# Patient Record
Sex: FEMALE | Race: WHITE | NOT HISPANIC OR LATINO | Employment: OTHER | URBAN - METROPOLITAN AREA
[De-identification: names, ages, dates, MRNs, and addresses within clinical notes are randomized per-mention and may not be internally consistent; named-entity substitution may affect disease eponyms.]

---

## 2023-06-29 ENCOUNTER — HOSPITAL ENCOUNTER (OUTPATIENT)
Dept: MRI IMAGING | Facility: CLINIC | Age: 73
Discharge: HOME | End: 2023-06-29
Attending: THORACIC SURGERY (CARDIOTHORACIC VASCULAR SURGERY) | Admitting: THORACIC SURGERY (CARDIOTHORACIC VASCULAR SURGERY)
Payer: MEDICARE

## 2023-06-29 DIAGNOSIS — C34.90 NON-SMALL CELL LUNG CANCER, UNSPECIFIED LATERALITY (H): ICD-10-CM

## 2023-06-29 PROCEDURE — 255N000002 HC RX 255 OP 636: Performed by: THORACIC SURGERY (CARDIOTHORACIC VASCULAR SURGERY)

## 2023-06-29 PROCEDURE — A9585 GADOBUTROL INJECTION: HCPCS | Performed by: THORACIC SURGERY (CARDIOTHORACIC VASCULAR SURGERY)

## 2023-06-29 PROCEDURE — 70553 MRI BRAIN STEM W/O & W/DYE: CPT

## 2023-06-29 RX ORDER — GADOBUTROL 604.72 MG/ML
6 INJECTION INTRAVENOUS ONCE
Status: COMPLETED | OUTPATIENT
Start: 2023-06-29 | End: 2023-06-29

## 2023-06-29 RX ADMIN — GADOBUTROL 6 ML: 604.72 INJECTION INTRAVENOUS at 07:09

## 2023-07-06 ENCOUNTER — DOCUMENTATION ONLY (OUTPATIENT)
Dept: OTHER | Facility: CLINIC | Age: 73
End: 2023-07-06
Payer: MEDICARE

## 2023-07-26 RX ORDER — CETIRIZINE HYDROCHLORIDE 10 MG/1
1 TABLET ORAL PRN
COMMUNITY

## 2023-07-26 RX ORDER — ACETAMINOPHEN 500 MG
1-2 TABLET ORAL EVERY 6 HOURS PRN
Status: ON HOLD | COMMUNITY
End: 2023-07-31

## 2023-07-26 RX ORDER — ZOLPIDEM TARTRATE 5 MG/1
1 TABLET ORAL
COMMUNITY

## 2023-07-26 RX ORDER — ALENDRONATE SODIUM 70 MG/1
1 TABLET ORAL
COMMUNITY

## 2023-07-26 RX ORDER — FAMOTIDINE 20 MG
1 TABLET ORAL EVERY EVENING
Status: ON HOLD | COMMUNITY
End: 2023-09-19

## 2023-07-26 RX ORDER — NITROFURANTOIN MACROCRYSTALS 50 MG/1
1 CAPSULE ORAL EVERY MORNING
COMMUNITY

## 2023-07-26 RX ORDER — ZINC GLUCONATE 50 MG
1 TABLET ORAL EVERY EVENING
Status: ON HOLD | COMMUNITY
End: 2023-09-19

## 2023-07-26 RX ORDER — CYCLOSPORINE 0.5 MG/ML
1 EMULSION OPHTHALMIC 2 TIMES DAILY
COMMUNITY

## 2023-07-26 RX ORDER — MULTIVIT WITH MINERALS/LUTEIN
1 TABLET ORAL EVERY EVENING
Status: ON HOLD | COMMUNITY
End: 2023-09-19

## 2023-07-26 RX ORDER — LORATADINE 10 MG
2 TABLET ORAL EVERY MORNING
COMMUNITY

## 2023-07-26 RX ORDER — MILK THISTLE 150 MG
1 CAPSULE ORAL EVERY MORNING
Status: ON HOLD | COMMUNITY
End: 2023-09-19

## 2023-07-27 NOTE — PROGRESS NOTES
PTA medications updated by Medication Scribe prior to surgery via phone call with patient (last doses completed by Nurse)     Medication history sources: Patient, H&P, and Patient's home med list  In the past week, patient estimated taking medication this percent of the time: Greater than 90%      Significant changes made to the medication list:  None      Additional medication history information:   None    Medication reconciliation completed by provider prior to medication history? No    Time spent in this activity: 25 minutes    The information provided in this note is only as accurate as the sources available at the time of update(s)    Prior to Admission medications    Medication Sig Last Dose Taking? Auth Provider Long Term End Date   acetaminophen (TYLENOL) 500 MG tablet Take 1-2 tablets by mouth every 6 hours as needed for mild pain Unknown at prn Yes Reported, Patient     alendronate (FOSAMAX) 70 MG tablet Take 1 tablet by mouth every 7 days Sundays 7/23/2023 at am Yes Reported, Patient Yes    cetirizine (ZYRTEC) 10 MG tablet Take 1 tablet by mouth as needed for allergies Unknown at prn Yes Reported, Patient     cycloSPORINE (RESTASIS) 0.05 % ophthalmic emulsion Place 1 drop into both eyes 2 times daily 7/27/2023 at pm Yes Reported, Patient     Fiber Select Gummies CHEW Take 2 chew tab by mouth every morning 7/27/2023 at am Yes Reported, Patient     Grape Seed Extract 100 MG CAPS Take 1 capsule by mouth every evening 7/27/2023 at pm Yes Reported, Patient     Milk Thistle 150 MG CAPS Take 1 capsule by mouth every morning 7/27/2023 at am Yes Reported, Patient     nitroFURantoin macrocrystal (MACRODANTIN) 50 MG capsule Take 1 capsule by mouth every morning 7/27/2023 at am Yes Reported, Patient     Probiotic Product (PROBIOTIC PO) Take 1 tablet by mouth every morning 7/27/2023 at am Yes Reported, Patient     vitamin C (ASCORBIC ACID) 1000 MG TABS Take 1 tablet by mouth every evening 7/27/2023 at pm Yes  Reported, Patient     Vitamin D, Cholecalciferol, 25 MCG (1000 UT) CAPS Take 1 capsule by mouth every evening 7/27/2023 at pm Yes Reported, Patient     zinc gluconate 50 MG tablet Take 1 tablet by mouth every evening 7/27/2023 at pm Yes Reported, Patient     zolpidem (AMBIEN) 5 MG tablet Take 1 tablet by mouth nightly as needed for sleep (only for traveling) Unknown at prn Yes Reported, Patient         Medication history completed by:    Jose Erickson CPhT  [facility]

## 2023-07-28 ENCOUNTER — APPOINTMENT (OUTPATIENT)
Dept: GENERAL RADIOLOGY | Facility: CLINIC | Age: 73
DRG: 164 | End: 2023-07-28
Attending: THORACIC SURGERY (CARDIOTHORACIC VASCULAR SURGERY)
Payer: MEDICARE

## 2023-07-28 ENCOUNTER — ANESTHESIA EVENT (OUTPATIENT)
Dept: SURGERY | Facility: CLINIC | Age: 73
DRG: 164 | End: 2023-07-28
Payer: MEDICARE

## 2023-07-28 ENCOUNTER — ANESTHESIA (OUTPATIENT)
Dept: SURGERY | Facility: CLINIC | Age: 73
DRG: 164 | End: 2023-07-28
Payer: MEDICARE

## 2023-07-28 ENCOUNTER — HOSPITAL ENCOUNTER (INPATIENT)
Facility: CLINIC | Age: 73
LOS: 3 days | Discharge: HOME | DRG: 164 | End: 2023-07-31
Attending: THORACIC SURGERY (CARDIOTHORACIC VASCULAR SURGERY) | Admitting: THORACIC SURGERY (CARDIOTHORACIC VASCULAR SURGERY)
Payer: MEDICARE

## 2023-07-28 DIAGNOSIS — G89.18 ACUTE POST-OPERATIVE PAIN: Primary | ICD-10-CM

## 2023-07-28 DIAGNOSIS — K59.03 DRUG-INDUCED CONSTIPATION: ICD-10-CM

## 2023-07-28 LAB
ABO/RH(D): NORMAL
ANION GAP SERPL CALCULATED.3IONS-SCNC: 10 MMOL/L (ref 7–15)
ANTIBODY SCREEN: NEGATIVE
BUN SERPL-MCNC: 12.4 MG/DL (ref 8–23)
CALCIUM SERPL-MCNC: 8.7 MG/DL (ref 8.8–10.2)
CHLORIDE SERPL-SCNC: 106 MMOL/L (ref 98–107)
CREAT SERPL-MCNC: 0.82 MG/DL (ref 0.51–0.95)
CREAT SERPL-MCNC: 0.87 MG/DL (ref 0.51–0.95)
DEPRECATED HCO3 PLAS-SCNC: 24 MMOL/L (ref 22–29)
ERYTHROCYTE [DISTWIDTH] IN BLOOD BY AUTOMATED COUNT: 11.9 % (ref 10–15)
GFR SERPL CREATININE-BSD FRML MDRD: 69 ML/MIN/1.73M2
GFR SERPL CREATININE-BSD FRML MDRD: 74 ML/MIN/1.73M2
GLUCOSE BLDC GLUCOMTR-MCNC: 135 MG/DL (ref 70–99)
GLUCOSE SERPL-MCNC: 114 MG/DL (ref 70–99)
HCT VFR BLD AUTO: 40.3 % (ref 35–47)
HGB BLD-MCNC: 13.4 G/DL (ref 11.7–15.7)
INR PPP: 0.97 (ref 0.85–1.15)
MCH RBC QN AUTO: 32.7 PG (ref 26.5–33)
MCHC RBC AUTO-ENTMCNC: 33.3 G/DL (ref 31.5–36.5)
MCV RBC AUTO: 98 FL (ref 78–100)
PLATELET # BLD AUTO: 306 10E3/UL (ref 150–450)
POTASSIUM SERPL-SCNC: 4.6 MMOL/L (ref 3.4–5.3)
RBC # BLD AUTO: 4.1 10E6/UL (ref 3.8–5.2)
SODIUM SERPL-SCNC: 140 MMOL/L (ref 136–145)
SPECIMEN EXPIRATION DATE: NORMAL
WBC # BLD AUTO: 3.4 10E3/UL (ref 4–11)

## 2023-07-28 PROCEDURE — 272N000001 HC OR GENERAL SUPPLY STERILE: Performed by: THORACIC SURGERY (CARDIOTHORACIC VASCULAR SURGERY)

## 2023-07-28 PROCEDURE — 82565 ASSAY OF CREATININE: CPT | Performed by: PHYSICIAN ASSISTANT

## 2023-07-28 PROCEDURE — 360N000077 HC SURGERY LEVEL 4, PER MIN: Performed by: THORACIC SURGERY (CARDIOTHORACIC VASCULAR SURGERY)

## 2023-07-28 PROCEDURE — 999N000063 XR CHEST PORT 1 VIEW

## 2023-07-28 PROCEDURE — 86850 RBC ANTIBODY SCREEN: CPT | Performed by: THORACIC SURGERY (CARDIOTHORACIC VASCULAR SURGERY)

## 2023-07-28 PROCEDURE — 80048 BASIC METABOLIC PNL TOTAL CA: CPT | Performed by: THORACIC SURGERY (CARDIOTHORACIC VASCULAR SURGERY)

## 2023-07-28 PROCEDURE — 258N000003 HC RX IP 258 OP 636: Performed by: PHYSICIAN ASSISTANT

## 2023-07-28 PROCEDURE — 250N000025 HC SEVOFLURANE, PER MIN: Performed by: THORACIC SURGERY (CARDIOTHORACIC VASCULAR SURGERY)

## 2023-07-28 PROCEDURE — 250N000011 HC RX IP 250 OP 636: Performed by: ANESTHESIOLOGY

## 2023-07-28 PROCEDURE — 88305 TISSUE EXAM BY PATHOLOGIST: CPT | Mod: TC | Performed by: THORACIC SURGERY (CARDIOTHORACIC VASCULAR SURGERY)

## 2023-07-28 PROCEDURE — 258N000003 HC RX IP 258 OP 636: Performed by: ANESTHESIOLOGY

## 2023-07-28 PROCEDURE — 86901 BLOOD TYPING SEROLOGIC RH(D): CPT | Performed by: THORACIC SURGERY (CARDIOTHORACIC VASCULAR SURGERY)

## 2023-07-28 PROCEDURE — 999N000141 HC STATISTIC PRE-PROCEDURE NURSING ASSESSMENT: Performed by: THORACIC SURGERY (CARDIOTHORACIC VASCULAR SURGERY)

## 2023-07-28 PROCEDURE — 250N000011 HC RX IP 250 OP 636: Performed by: THORACIC SURGERY (CARDIOTHORACIC VASCULAR SURGERY)

## 2023-07-28 PROCEDURE — 36415 COLL VENOUS BLD VENIPUNCTURE: CPT | Performed by: THORACIC SURGERY (CARDIOTHORACIC VASCULAR SURGERY)

## 2023-07-28 PROCEDURE — 250N000013 HC RX MED GY IP 250 OP 250 PS 637: Performed by: PHYSICIAN ASSISTANT

## 2023-07-28 PROCEDURE — 250N000009 HC RX 250: Performed by: THORACIC SURGERY (CARDIOTHORACIC VASCULAR SURGERY)

## 2023-07-28 PROCEDURE — 36415 COLL VENOUS BLD VENIPUNCTURE: CPT | Performed by: PHYSICIAN ASSISTANT

## 2023-07-28 PROCEDURE — 0BTJ0ZZ RESECTION OF LEFT LOWER LUNG LOBE, OPEN APPROACH: ICD-10-PCS | Performed by: THORACIC SURGERY (CARDIOTHORACIC VASCULAR SURGERY)

## 2023-07-28 PROCEDURE — 710N000009 HC RECOVERY PHASE 1, LEVEL 1, PER MIN: Performed by: THORACIC SURGERY (CARDIOTHORACIC VASCULAR SURGERY)

## 2023-07-28 PROCEDURE — 120N000013 HC R&B IMCU

## 2023-07-28 PROCEDURE — 07B70ZX EXCISION OF THORAX LYMPHATIC, OPEN APPROACH, DIAGNOSTIC: ICD-10-PCS | Performed by: THORACIC SURGERY (CARDIOTHORACIC VASCULAR SURGERY)

## 2023-07-28 PROCEDURE — 370N000017 HC ANESTHESIA TECHNICAL FEE, PER MIN: Performed by: THORACIC SURGERY (CARDIOTHORACIC VASCULAR SURGERY)

## 2023-07-28 PROCEDURE — 85610 PROTHROMBIN TIME: CPT | Performed by: THORACIC SURGERY (CARDIOTHORACIC VASCULAR SURGERY)

## 2023-07-28 PROCEDURE — 250N000011 HC RX IP 250 OP 636: Mod: JZ | Performed by: PHYSICIAN ASSISTANT

## 2023-07-28 PROCEDURE — 85027 COMPLETE CBC AUTOMATED: CPT | Performed by: THORACIC SURGERY (CARDIOTHORACIC VASCULAR SURGERY)

## 2023-07-28 PROCEDURE — 250N000009 HC RX 250: Performed by: ANESTHESIOLOGY

## 2023-07-28 RX ORDER — HYDROMORPHONE HYDROCHLORIDE 1 MG/ML
0.3 INJECTION, SOLUTION INTRAMUSCULAR; INTRAVENOUS; SUBCUTANEOUS
Status: DISCONTINUED | OUTPATIENT
Start: 2023-07-28 | End: 2023-07-29

## 2023-07-28 RX ORDER — HYDROMORPHONE HCL IN WATER/PF 6 MG/30 ML
0.4 PATIENT CONTROLLED ANALGESIA SYRINGE INTRAVENOUS EVERY 5 MIN PRN
Status: DISCONTINUED | OUTPATIENT
Start: 2023-07-28 | End: 2023-07-28

## 2023-07-28 RX ORDER — CEFAZOLIN SODIUM/WATER 2 G/20 ML
2 SYRINGE (ML) INTRAVENOUS
Status: COMPLETED | OUTPATIENT
Start: 2023-07-28 | End: 2023-07-28

## 2023-07-28 RX ORDER — LIDOCAINE HYDROCHLORIDE 20 MG/ML
INJECTION, SOLUTION INFILTRATION; PERINEURAL PRN
Status: DISCONTINUED | OUTPATIENT
Start: 2023-07-28 | End: 2023-07-28

## 2023-07-28 RX ORDER — ACETAMINOPHEN 325 MG/1
975 TABLET ORAL EVERY 8 HOURS SCHEDULED
Status: DISCONTINUED | OUTPATIENT
Start: 2023-07-28 | End: 2023-07-29

## 2023-07-28 RX ORDER — PROPOFOL 10 MG/ML
INJECTION, EMULSION INTRAVENOUS CONTINUOUS PRN
Status: DISCONTINUED | OUTPATIENT
Start: 2023-07-28 | End: 2023-07-28

## 2023-07-28 RX ORDER — PROPOFOL 10 MG/ML
INJECTION, EMULSION INTRAVENOUS PRN
Status: DISCONTINUED | OUTPATIENT
Start: 2023-07-28 | End: 2023-07-28

## 2023-07-28 RX ORDER — BUPIVACAINE HYDROCHLORIDE 5 MG/ML
INJECTION, SOLUTION PERINEURAL PRN
Status: DISCONTINUED | OUTPATIENT
Start: 2023-07-28 | End: 2023-07-28 | Stop reason: HOSPADM

## 2023-07-28 RX ORDER — ALENDRONATE SODIUM 70 MG/1
70 TABLET ORAL
Status: DISCONTINUED | OUTPATIENT
Start: 2023-07-30 | End: 2023-07-28

## 2023-07-28 RX ORDER — DIPHENHYDRAMINE HYDROCHLORIDE 50 MG/ML
12.5 INJECTION INTRAMUSCULAR; INTRAVENOUS EVERY 6 HOURS PRN
Status: DISCONTINUED | OUTPATIENT
Start: 2023-07-28 | End: 2023-07-31 | Stop reason: HOSPADM

## 2023-07-28 RX ORDER — HYDROMORPHONE HCL IN WATER/PF 6 MG/30 ML
0.2 PATIENT CONTROLLED ANALGESIA SYRINGE INTRAVENOUS
Status: DISCONTINUED | OUTPATIENT
Start: 2023-07-28 | End: 2023-07-29

## 2023-07-28 RX ORDER — NITROGLYCERIN 0.4 MG/1
0.4 TABLET SUBLINGUAL EVERY 5 MIN PRN
Status: DISCONTINUED | OUTPATIENT
Start: 2023-07-28 | End: 2023-07-31

## 2023-07-28 RX ORDER — HYDROMORPHONE HCL IN WATER/PF 6 MG/30 ML
0.2 PATIENT CONTROLLED ANALGESIA SYRINGE INTRAVENOUS EVERY 5 MIN PRN
Status: DISCONTINUED | OUTPATIENT
Start: 2023-07-28 | End: 2023-07-28

## 2023-07-28 RX ORDER — IBUPROFEN 600 MG/1
600 TABLET, FILM COATED ORAL
Status: DISCONTINUED | OUTPATIENT
Start: 2023-07-29 | End: 2023-07-31 | Stop reason: HOSPADM

## 2023-07-28 RX ORDER — ACETAMINOPHEN 325 MG/1
650 TABLET ORAL EVERY 4 HOURS PRN
Status: DISCONTINUED | OUTPATIENT
Start: 2023-07-31 | End: 2023-07-29

## 2023-07-28 RX ORDER — NALOXONE HYDROCHLORIDE 0.4 MG/ML
0.2 INJECTION, SOLUTION INTRAMUSCULAR; INTRAVENOUS; SUBCUTANEOUS
Status: DISCONTINUED | OUTPATIENT
Start: 2023-07-28 | End: 2023-07-31 | Stop reason: HOSPADM

## 2023-07-28 RX ORDER — FENTANYL CITRATE 50 UG/ML
25 INJECTION, SOLUTION INTRAMUSCULAR; INTRAVENOUS EVERY 5 MIN PRN
Status: DISCONTINUED | OUTPATIENT
Start: 2023-07-28 | End: 2023-07-28

## 2023-07-28 RX ORDER — ONDANSETRON 4 MG/1
4 TABLET, ORALLY DISINTEGRATING ORAL EVERY 30 MIN PRN
Status: DISCONTINUED | OUTPATIENT
Start: 2023-07-28 | End: 2023-07-28

## 2023-07-28 RX ORDER — AMOXICILLIN 250 MG
1 CAPSULE ORAL 2 TIMES DAILY
Status: DISCONTINUED | OUTPATIENT
Start: 2023-07-28 | End: 2023-07-31 | Stop reason: HOSPADM

## 2023-07-28 RX ORDER — LIDOCAINE 40 MG/G
CREAM TOPICAL
Status: DISCONTINUED | OUTPATIENT
Start: 2023-07-28 | End: 2023-07-29

## 2023-07-28 RX ORDER — DEXAMETHASONE SODIUM PHOSPHATE 4 MG/ML
INJECTION, SOLUTION INTRA-ARTICULAR; INTRALESIONAL; INTRAMUSCULAR; INTRAVENOUS; SOFT TISSUE PRN
Status: DISCONTINUED | OUTPATIENT
Start: 2023-07-28 | End: 2023-07-28

## 2023-07-28 RX ORDER — ONDANSETRON 2 MG/ML
INJECTION INTRAMUSCULAR; INTRAVENOUS PRN
Status: DISCONTINUED | OUTPATIENT
Start: 2023-07-28 | End: 2023-07-28

## 2023-07-28 RX ORDER — ONDANSETRON 2 MG/ML
4 INJECTION INTRAMUSCULAR; INTRAVENOUS EVERY 6 HOURS PRN
Status: DISCONTINUED | OUTPATIENT
Start: 2023-07-28 | End: 2023-07-31 | Stop reason: HOSPADM

## 2023-07-28 RX ORDER — NITROFURANTOIN MACROCRYSTALS 50 MG/1
50 CAPSULE ORAL EVERY MORNING
Status: DISCONTINUED | OUTPATIENT
Start: 2023-07-28 | End: 2023-07-31 | Stop reason: HOSPADM

## 2023-07-28 RX ORDER — LIDOCAINE 40 MG/G
CREAM TOPICAL
Status: DISCONTINUED | OUTPATIENT
Start: 2023-07-28 | End: 2023-07-31

## 2023-07-28 RX ORDER — BISACODYL 10 MG
10 SUPPOSITORY, RECTAL RECTAL DAILY PRN
Status: DISCONTINUED | OUTPATIENT
Start: 2023-07-28 | End: 2023-07-31 | Stop reason: HOSPADM

## 2023-07-28 RX ORDER — DIPHENHYDRAMINE HCL 12.5MG/5ML
12.5 LIQUID (ML) ORAL EVERY 6 HOURS PRN
Status: DISCONTINUED | OUTPATIENT
Start: 2023-07-28 | End: 2023-07-31 | Stop reason: HOSPADM

## 2023-07-28 RX ORDER — SODIUM CHLORIDE 9 MG/ML
INJECTION, SOLUTION INTRAVENOUS CONTINUOUS
Status: DISCONTINUED | OUTPATIENT
Start: 2023-07-28 | End: 2023-07-31 | Stop reason: HOSPADM

## 2023-07-28 RX ORDER — KETOROLAC TROMETHAMINE 15 MG/ML
15 INJECTION, SOLUTION INTRAMUSCULAR; INTRAVENOUS EVERY 6 HOURS
Status: COMPLETED | OUTPATIENT
Start: 2023-07-28 | End: 2023-07-29

## 2023-07-28 RX ORDER — SODIUM CHLORIDE, SODIUM LACTATE, POTASSIUM CHLORIDE, CALCIUM CHLORIDE 600; 310; 30; 20 MG/100ML; MG/100ML; MG/100ML; MG/100ML
INJECTION, SOLUTION INTRAVENOUS CONTINUOUS
Status: DISCONTINUED | OUTPATIENT
Start: 2023-07-28 | End: 2023-07-28 | Stop reason: HOSPADM

## 2023-07-28 RX ORDER — MAGNESIUM HYDROXIDE 1200 MG/15ML
LIQUID ORAL PRN
Status: DISCONTINUED | OUTPATIENT
Start: 2023-07-28 | End: 2023-07-28 | Stop reason: HOSPADM

## 2023-07-28 RX ORDER — CETIRIZINE HYDROCHLORIDE 10 MG/1
10 TABLET ORAL DAILY PRN
Status: DISCONTINUED | OUTPATIENT
Start: 2023-07-28 | End: 2023-07-31 | Stop reason: HOSPADM

## 2023-07-28 RX ORDER — SODIUM CHLORIDE, SODIUM LACTATE, POTASSIUM CHLORIDE, CALCIUM CHLORIDE 600; 310; 30; 20 MG/100ML; MG/100ML; MG/100ML; MG/100ML
INJECTION, SOLUTION INTRAVENOUS CONTINUOUS
Status: DISCONTINUED | OUTPATIENT
Start: 2023-07-28 | End: 2023-07-28

## 2023-07-28 RX ORDER — ENOXAPARIN SODIUM 100 MG/ML
40 INJECTION SUBCUTANEOUS EVERY 24 HOURS
Status: DISCONTINUED | OUTPATIENT
Start: 2023-07-29 | End: 2023-07-31 | Stop reason: HOSPADM

## 2023-07-28 RX ORDER — CYCLOSPORINE 0.5 MG/ML
1 EMULSION OPHTHALMIC 2 TIMES DAILY
Status: DISCONTINUED | OUTPATIENT
Start: 2023-07-28 | End: 2023-07-31 | Stop reason: HOSPADM

## 2023-07-28 RX ORDER — HYDROMORPHONE HYDROCHLORIDE 2 MG/1
2 TABLET ORAL
Status: DISCONTINUED | OUTPATIENT
Start: 2023-07-28 | End: 2023-07-31 | Stop reason: HOSPADM

## 2023-07-28 RX ORDER — PROCHLORPERAZINE MALEATE 5 MG
5 TABLET ORAL EVERY 6 HOURS PRN
Status: DISCONTINUED | OUTPATIENT
Start: 2023-07-28 | End: 2023-07-31 | Stop reason: HOSPADM

## 2023-07-28 RX ORDER — CEFAZOLIN SODIUM/WATER 2 G/20 ML
2 SYRINGE (ML) INTRAVENOUS SEE ADMIN INSTRUCTIONS
Status: DISCONTINUED | OUTPATIENT
Start: 2023-07-28 | End: 2023-07-28 | Stop reason: HOSPADM

## 2023-07-28 RX ORDER — FAMOTIDINE 20 MG/1
20 TABLET, FILM COATED ORAL DAILY
Status: DISCONTINUED | OUTPATIENT
Start: 2023-07-28 | End: 2023-07-31 | Stop reason: HOSPADM

## 2023-07-28 RX ORDER — NALOXONE HYDROCHLORIDE 0.4 MG/ML
0.4 INJECTION, SOLUTION INTRAMUSCULAR; INTRAVENOUS; SUBCUTANEOUS
Status: DISCONTINUED | OUTPATIENT
Start: 2023-07-28 | End: 2023-07-31 | Stop reason: HOSPADM

## 2023-07-28 RX ORDER — FENTANYL CITRATE 50 UG/ML
50 INJECTION, SOLUTION INTRAMUSCULAR; INTRAVENOUS EVERY 5 MIN PRN
Status: DISCONTINUED | OUTPATIENT
Start: 2023-07-28 | End: 2023-07-28

## 2023-07-28 RX ORDER — FENTANYL CITRATE 50 UG/ML
INJECTION, SOLUTION INTRAMUSCULAR; INTRAVENOUS PRN
Status: DISCONTINUED | OUTPATIENT
Start: 2023-07-28 | End: 2023-07-28

## 2023-07-28 RX ORDER — CALCIUM CARBONATE 500 MG/1
500 TABLET, CHEWABLE ORAL 4 TIMES DAILY PRN
Status: DISCONTINUED | OUTPATIENT
Start: 2023-07-28 | End: 2023-07-31 | Stop reason: HOSPADM

## 2023-07-28 RX ORDER — ONDANSETRON 4 MG/1
4 TABLET, ORALLY DISINTEGRATING ORAL EVERY 6 HOURS PRN
Status: DISCONTINUED | OUTPATIENT
Start: 2023-07-28 | End: 2023-07-31 | Stop reason: HOSPADM

## 2023-07-28 RX ORDER — POLYETHYLENE GLYCOL 3350 17 G/17G
17 POWDER, FOR SOLUTION ORAL DAILY
Status: DISCONTINUED | OUTPATIENT
Start: 2023-07-29 | End: 2023-07-31 | Stop reason: HOSPADM

## 2023-07-28 RX ORDER — GINSENG 100 MG
CAPSULE ORAL
Status: DISCONTINUED | OUTPATIENT
Start: 2023-07-28 | End: 2023-07-31 | Stop reason: HOSPADM

## 2023-07-28 RX ORDER — ONDANSETRON 2 MG/ML
4 INJECTION INTRAMUSCULAR; INTRAVENOUS EVERY 30 MIN PRN
Status: DISCONTINUED | OUTPATIENT
Start: 2023-07-28 | End: 2023-07-28

## 2023-07-28 RX ADMIN — HYDROMORPHONE HYDROCHLORIDE 2 MG: 2 TABLET ORAL at 13:56

## 2023-07-28 RX ADMIN — HYDROMORPHONE HYDROCHLORIDE 1 MG: 2 TABLET ORAL at 23:35

## 2023-07-28 RX ADMIN — KETOROLAC TROMETHAMINE 15 MG: 15 INJECTION, SOLUTION INTRAMUSCULAR; INTRAVENOUS at 10:57

## 2023-07-28 RX ADMIN — ONDANSETRON 4 MG: 2 INJECTION INTRAMUSCULAR; INTRAVENOUS at 08:39

## 2023-07-28 RX ADMIN — PHENYLEPHRINE HYDROCHLORIDE 100 MCG: 10 INJECTION INTRAVENOUS at 08:09

## 2023-07-28 RX ADMIN — SODIUM CHLORIDE, POTASSIUM CHLORIDE, SODIUM LACTATE AND CALCIUM CHLORIDE: 600; 310; 30; 20 INJECTION, SOLUTION INTRAVENOUS at 10:05

## 2023-07-28 RX ADMIN — ACETAMINOPHEN 975 MG: 325 TABLET, FILM COATED ORAL at 11:00

## 2023-07-28 RX ADMIN — Medication 2 G: at 07:28

## 2023-07-28 RX ADMIN — ACETAMINOPHEN 975 MG: 325 TABLET, FILM COATED ORAL at 21:27

## 2023-07-28 RX ADMIN — DEXAMETHASONE SODIUM PHOSPHATE 4 MG: 4 INJECTION, SOLUTION INTRA-ARTICULAR; INTRALESIONAL; INTRAMUSCULAR; INTRAVENOUS; SOFT TISSUE at 07:40

## 2023-07-28 RX ADMIN — PHENYLEPHRINE HYDROCHLORIDE 100 MCG: 10 INJECTION INTRAVENOUS at 08:16

## 2023-07-28 RX ADMIN — PROPOFOL 40 MG: 10 INJECTION, EMULSION INTRAVENOUS at 07:33

## 2023-07-28 RX ADMIN — SODIUM CHLORIDE, POTASSIUM CHLORIDE, SODIUM LACTATE AND CALCIUM CHLORIDE: 600; 310; 30; 20 INJECTION, SOLUTION INTRAVENOUS at 06:46

## 2023-07-28 RX ADMIN — SENNOSIDES AND DOCUSATE SODIUM 1 TABLET: 50; 8.6 TABLET ORAL at 10:57

## 2023-07-28 RX ADMIN — PROPOFOL 20 MCG/KG/MIN: 10 INJECTION, EMULSION INTRAVENOUS at 07:39

## 2023-07-28 RX ADMIN — PROPOFOL 200 MG: 10 INJECTION, EMULSION INTRAVENOUS at 07:31

## 2023-07-28 RX ADMIN — ROCURONIUM BROMIDE 50 MG: 50 INJECTION, SOLUTION INTRAVENOUS at 07:31

## 2023-07-28 RX ADMIN — LIDOCAINE HYDROCHLORIDE 100 MG: 20 INJECTION, SOLUTION INFILTRATION; PERINEURAL at 07:31

## 2023-07-28 RX ADMIN — SENNOSIDES AND DOCUSATE SODIUM 1 TABLET: 50; 8.6 TABLET ORAL at 21:27

## 2023-07-28 RX ADMIN — FAMOTIDINE 20 MG: 20 TABLET ORAL at 13:56

## 2023-07-28 RX ADMIN — FENTANYL CITRATE 25 MCG: 50 INJECTION, SOLUTION INTRAMUSCULAR; INTRAVENOUS at 09:33

## 2023-07-28 RX ADMIN — PHENYLEPHRINE HYDROCHLORIDE 100 MCG: 10 INJECTION INTRAVENOUS at 08:51

## 2023-07-28 RX ADMIN — ROCURONIUM BROMIDE 10 MG: 50 INJECTION, SOLUTION INTRAVENOUS at 08:31

## 2023-07-28 RX ADMIN — SODIUM CHLORIDE: 9 INJECTION, SOLUTION INTRAVENOUS at 10:57

## 2023-07-28 RX ADMIN — NITROFURANTOIN MACROCRYSTALS 50 MG: 50 CAPSULE ORAL at 13:56

## 2023-07-28 RX ADMIN — CYCLOSPORINE 1 DROP: 0.5 EMULSION OPHTHALMIC at 21:41

## 2023-07-28 RX ADMIN — HYDROMORPHONE HYDROCHLORIDE 0.5 MG: 1 INJECTION, SOLUTION INTRAMUSCULAR; INTRAVENOUS; SUBCUTANEOUS at 07:49

## 2023-07-28 RX ADMIN — KETOROLAC TROMETHAMINE 15 MG: 15 INJECTION, SOLUTION INTRAMUSCULAR; INTRAVENOUS at 17:03

## 2023-07-28 RX ADMIN — HYDROMORPHONE HYDROCHLORIDE 2 MG: 2 TABLET ORAL at 10:57

## 2023-07-28 RX ADMIN — FENTANYL CITRATE 100 MCG: 50 INJECTION, SOLUTION INTRAMUSCULAR; INTRAVENOUS at 07:27

## 2023-07-28 RX ADMIN — SODIUM CHLORIDE: 9 INJECTION, SOLUTION INTRAVENOUS at 22:41

## 2023-07-28 RX ADMIN — PHENYLEPHRINE HYDROCHLORIDE 100 MCG: 10 INJECTION INTRAVENOUS at 07:46

## 2023-07-28 RX ADMIN — SUGAMMADEX 200 MG: 100 INJECTION, SOLUTION INTRAVENOUS at 09:13

## 2023-07-28 RX ADMIN — KETOROLAC TROMETHAMINE 15 MG: 15 INJECTION, SOLUTION INTRAMUSCULAR; INTRAVENOUS at 22:41

## 2023-07-28 ASSESSMENT — ACTIVITIES OF DAILY LIVING (ADL)
ADLS_ACUITY_SCORE: 25
ADLS_ACUITY_SCORE: 20
ADLS_ACUITY_SCORE: 25
ADLS_ACUITY_SCORE: 20
ADLS_ACUITY_SCORE: 25
ADLS_ACUITY_SCORE: 25
ADLS_ACUITY_SCORE: 20
ADLS_ACUITY_SCORE: 25
ADLS_ACUITY_SCORE: 25

## 2023-07-28 ASSESSMENT — LIFESTYLE VARIABLES: TOBACCO_USE: 1

## 2023-07-28 NOTE — PHARMACY
"The following home medications were NOT continued on inpatient admission per \"Discontinuation of nonessential home medications during hospitalization\" policy: Alendronate    If a therapeutic holiday is deemed inappropriate per the prescriber, please notify the pharmacist regarding the medication order.    The pharmacist is available to answer any questions and/or concerns the patient may have regarding discontinuation of non-essential medications.    Please ensure that these medications are restarted as needed upon discharge via the medication reconciliation discharge process and included on the discharge medication reconciliation report.    Thank you,  Sharonda Weinstein, PharmD    "

## 2023-07-28 NOTE — ANESTHESIA PREPROCEDURE EVALUATION
Prior to Admission medications    Medication Sig Start Date End Date Taking? Authorizing Provider   acetaminophen (TYLENOL) 500 MG tablet Take 1-2 tablets by mouth every 6 hours as needed for mild pain   Yes Reported, Patient   alendronate (FOSAMAX) 70 MG tablet Take 1 tablet by mouth every 7 days Sundays   Yes Reported, Patient   cetirizine (ZYRTEC) 10 MG tablet Take 1 tablet by mouth as needed for allergies   Yes Reported, Patient   cycloSPORINE (RESTASIS) 0.05 % ophthalmic emulsion Place 1 drop into both eyes 2 times daily   Yes Reported, Patient   Fiber Select Gummies CHEW Take 2 chew tab by mouth every morning   Yes Reported, Patient   Grape Seed Extract 100 MG CAPS Take 1 capsule by mouth every evening   Yes Reported, Patient   Milk Thistle 150 MG CAPS Take 1 capsule by mouth every morning   Yes Reported, Patient   nitroFURantoin macrocrystal (MACRODANTIN) 50 MG capsule Take 1 capsule by mouth every morning   Yes Reported, Patient   Probiotic Product (PROBIOTIC PO) Take 1 tablet by mouth every morning   Yes Reported, Patient   vitamin C (ASCORBIC ACID) 1000 MG TABS Take 1 tablet by mouth every evening   Yes Reported, Patient   Vitamin D, Cholecalciferol, 25 MCG (1000 UT) CAPS Take 1 capsule by mouth every evening   Yes Reported, Patient   zinc gluconate 50 MG tablet Take 1 tablet by mouth every evening   Yes Reported, Patient   zolpidem (AMBIEN) 5 MG tablet Take 1 tablet by mouth nightly as needed for sleep (only for traveling)   Yes Reported, Patient     Current Facility-Administered Medications Ordered in Epic   Medication Dose Route Frequency Last Rate Last Admin    ceFAZolin Sodium (ANCEF) injection 2 g  2 g Intravenous Pre-Op/Pre-procedure x 1 dose        ceFAZolin Sodium (ANCEF) injection 2 g  2 g Intravenous See Admin Instructions        lactated ringers infusion   Intravenous Continuous        lidocaine 1 % 0.1-1 mL  0.1-1 mL Other Q1H PRN         No current [facility]-ordered outpatient medications on file.       lactated ringers       No results for input(s): ABO, RH in the last 67267 hours.  No results for input(s): HCG in the last 19896 hours.  Recent Results (from the past 744 hour(s))   MR Brain w/o & w Contrast    Narrative    MRI BRAIN WITHOUT AND WITH CONTRAST  2023 8:16 AM     HISTORY: Non-small cell lung cancer, unspecified laterality (H).     TECHNIQUE: Multiplanar, multisequence MRI of the brain without and  with 6mL Gadavist.      COMPARISON: None.     FINDINGS: No abnormal intracranial restricted diffusion diffusion  identified. The ventricles are normal in size and configuration. There  is mild to moderate generalized cerebral volume loss. Mild scattered  patchy foci of nonspecific T2 FLAIR hyperintense signal in the  cerebral white matter, likely due to chronic small vessel ischemic  changes. Probable tiny foci of chronic lacunar infarction in the left  caudate body and left corona radiata (series 801 image 18, series 701  image 18). No intracranial hemorrhage, extra axial fluid collection,  or mass effect. No intracranial mass or abnormal enhancement is  identified.    Orbits appear grossly unremarkable, accounting for technique. Moderate  left and mild right fluid/membrane thickening in the mastoid air  cells. Major arterial flow voids of the skull base are grossly  maintained. The calvarium, skull base, and midface otherwise appear  unremarkable.      Impression    IMPRESSION:  1. No intracranial metastasis.  2. No acute intracranial process.  3. Brain atrophy and presumed chronic ischemic changes, as described.    MARYBEL BOWIE MD         SYSTEM ID:  [id number]    Anesthesia Pre-Procedure Evaluation    Patient: Erin Casas   MRN: [id number] : 1950        Procedure : Procedure(s):  Left Thoracotomy  Left Lower Lobectomy          Past Medical History:   Diagnosis Date    Abdominal bloating     Adenocarcinoma of left lung (H)     Depression     Midline thoracic back pain     Mononeuritis      Osteoporosis     Sigmoid diverticulosis       Past Surgical History:   Procedure Laterality Date    HERNIA REPAIR      pr anesthesia of shoulder for impingement, bilateral        No Known Allergies   Social History     Tobacco Use    Smoking status: Former     Packs/day: 0.50     Years: 10.00     Pack years: 5.00     Types: Cigarettes     Start date:      Quit date:      Years since quittin.5    Smokeless tobacco: Never   Substance Use Topics    Alcohol use: Yes     Comment: 7 glasses of wine per week      Wt Readings from Last 1 Encounters:   No data found for Wt        Anesthesia Evaluation   Pt has had prior anesthetic.     No history of anesthetic complications       ROS/MED HX  ENT/Pulmonary:     (+)                tobacco use, Past use,                      Neurologic:       Cardiovascular:       METS/Exercise Tolerance:     Hematologic:       Musculoskeletal:       GI/Hepatic:     (+)       Inflammatory bowel disease,             Renal/Genitourinary:       Endo:       Psychiatric/Substance Use:       Infectious Disease:       Malignancy:   (+) Malignancy, History of Lung.  Lung CA Active status post Surgery.      Other:            Physical Exam    Airway        Mallampati: I    Neck ROM: full     Respiratory Devices and Support         Dental       (+) Modest Abnormalities - crowns, retainers, 1 or 2 missing teeth      Cardiovascular   cardiovascular exam normal          Pulmonary   pulmonary exam normal                OUTSIDE LABS:  CBC:   Lab Results   Component Value Date    WBC 3.4 (L) 2023    HGB 13.4 2023    HCT 40.3 2023     2023     BMP:   Lab Results   Component Value Date     2023    POTASSIUM 4.6 2023    CHLORIDE 106 2023    CO2 24 2023    BUN 12.4 2023    CR 0.87 2023     (H) 2023     COAGS:   Lab Results   Component Value Date    INR 0.97 2023     POC: No results found for: BGM, HCG,  HCGS  HEPATIC: No results found for: ALBUMIN, PROTTOTAL, ALT, AST, GGT, ALKPHOS, BILITOTAL, BILIDIRECT, ISAURA  OTHER:   Lab Results   Component Value Date    CARLOS 8.7 (L) 07/28/2023       Anesthesia Plan    ASA Status:  2    NPO Status:  NPO Appropriate    Anesthesia Type: General.     - Airway: ETT   Induction: Intravenous.   Maintenance: Balanced.   Techniques and Equipment:     - Airway: Video-Laryngoscope, Double lumen ETT       Consents    Anesthesia Plan(s) and associated risks, benefits, and realistic alternatives discussed. Questions answered and patient/representative(s) expressed understanding.     - Discussed:     - Discussed with:  Patient            Postoperative Care    Pain management: IV analgesics.   PONV prophylaxis: Ondansetron (or other 5HT-3)     Comments:                Torito Moreno MD

## 2023-07-28 NOTE — ANESTHESIA PROCEDURE NOTES
Airway       Patient location during procedure: OR       Procedure Start/Stop Times: 7/28/2023 7:34 AM  Staff -        Anesthesiologist:  Torito Moreno MD       CRNA: Kaelyn Hidalgo APRN CRNA       Other Anesthesia Staff: Lana Solis       Performed By: anesthesiologist  Consent for Airway        Urgency: elective  Indications and Patient Condition       Indications for airway management: aaron-procedural and airway protection       Induction type:intravenous       Mask difficulty assessment: 1 - vent by mask    Final Airway Details       Final airway type: endotracheal airway       Successful airway: ETT - double lumen left  Endotracheal Airway Details        Cuffed: yes       Successful intubation technique: video laryngoscopy and flexible bronchoscopy       VL Blade Size: Glidescope 3       Grade View of Cords: 1       Adjucts: stylet       Position: Right       ETT measured from: at hub.       Bite block used: None       ETT Double lumen (fr): 35    Post intubation assessment        Placement verified by: capnometry and equal breath sounds        Number of attempts at approach: 1       Number of other approaches attempted: 0       Secured with: pink tape       Ease of procedure: easy       Dentition: Intact and Unchanged    Medication(s) Administered   Medication Administration Time: 7/28/2023 7:34 AM

## 2023-07-28 NOTE — BRIEF OP NOTE
[facility]    Brief Operative Note    Pre-operative diagnosis: Primary adenocarcinoma of lower lobe of left lung (H) [C34.32]  Post-operative diagnosis left lower lobe adenocarcinoma    Procedure: Procedure(s):  LEFT THORACOTOMY WITH MEDIASINAL LYMPH NODE DISSECTION  Left Lower Lobectomy  Surgeon: Surgeon(s) and Role:     * Baron Cummings MD - Primary     * Lilli Hernandez PA-C - Assisting  Anesthesia: General   Estimated Blood Loss: Less than 50 ml    Drains: One 28 straight chest tube to left apex  Specimens:   ID Type Source Tests Collected by Time Destination   1 : #9 INFERIOR PULMONARY LIGAMENT NODE Tissue Lymph Node(s) SURGICAL PATHOLOGY EXAM Baron Cummings MD 7/28/2023  7:56 AM    2 : #5 A-P WINDOW NODE Tissue Lymph Node(s) SURGICAL PATHOLOGY EXAM Baron Cummings MD 7/28/2023  7:57 AM    3 : #7 SUBCARINAL NODE Tissue Lymph Node(s) SURGICAL PATHOLOGY EXAM Baron Cummings MD 7/28/2023  7:59 AM    4 : #10L POSTERIOR HILAR NODE Tissue Lymph Node(s) SURGICAL PATHOLOGY EXAM Baron Cummings MD 7/28/2023  8:02 AM    5 : LEFT LOWER LOBE LUNG Tissue Lung, Lower Lobe, Left SURGICAL PATHOLOGY EXAM Baron Cummings MD 7/28/2023  8:28 AM    6 : #11 INTERLOBAR NODE Tissue Lymph Node(s) SURGICAL PATHOLOGY EXAM Baron Cummings MD 7/28/2023  8:13 AM    7 : #10L ANTERIOR HILAR NODE Tissue Lymph Node(s) SURGICAL PATHOLOGY EXAM Baron Cummings MD 7/28/2023  8:24 AM      Findings:   No pleural fluid nor pleural nodules, two palpable tumors in left lower lobe lung as expected. Await final path and staging .  Complications: None.  Implants: * No implants in log *

## 2023-07-28 NOTE — PLAN OF CARE
[id number]    Orientations: A&Ox4   Vitals/Pain: VSS, RA (1L NC at times), LS clear/diminished . Pain 5/10 (tylenol, PO dilaudid x2)   Tele: SR 1st AVB   Lines/Drains: PIV R infusing. L CT (-20 suction)   Skin/Wounds: L thoracotomy site, OnQ pump, CT site   GI/: Voiding well (bathroom), No BM (hypoactive). Pt tolerating clears well and advanced to regular diet (pt educated on going slow w/ food and starting w/ easy foods but encouraging PO intake per pt cravings).   Labs: Abnormal/Trends: BG (135), calcium (8.7), WBC (3.4)   Electrolyte Replacement- N/A  Ambulation/Assist: Assist x1 g/w   Plan: Increase AOOB, monitor pain and incision/CT sites and output. Ambulated x1 already. Ice applied to incision site. Daughters art bedside. CT has small intermittent air leak, Dr Obrien and Lilli know and saw AL.

## 2023-07-28 NOTE — OP NOTE
PROCEDURE DATE: 07/28/2023    SURGEON: Baron Cummings MD    FIRST ASSISTANT: Lilli Hernandez PA-C     PREOPERATIVE DIAGNOSIS: Adenocarcinoma left lower lobe lung.  Ground glass opacity superior segment left lower lobe lung    POSTOPERATIVE DIAGNOSIS: Same    PROCEDURES:  Limited left thoracotomy, left lower lobectomy, mediastinal lymph node dissection    ANESTHESIA: General with double-lumen endotracheal tube      INDICATIONS FOR PROCEDURE: 73-year-old woman with lung lung lesions.  The PET scan did not show evidence of issac or distant disease.  There is a biopsy-proven adenocarcinoma towards the base of the left lower lobe lung.  There is a groundglass opacity in the superior segment left lower lobe lung which is suspicious for a second primary adenocarcinoma.  Deep in the left upper lobe lung, there is a third lesion which is biopsy-proven adenocarcinoma and will be treated with SBRT.  There is a faint opacity in the right upper lobe lung that will require ongoing observation.  Options of treatment were discussed.  It was elected to proceed with a left lower lobectomy and mediastinal lymph node dissection.      DESCRIPTION OF PROCEDURE: The patient was brought to the operating room and placed in a supine position.  Under general esthesia with a double-lumen endotracheal tube, the patient was placed in the right lateral decubitus position.  The left chest was prepared and draped in usual fashion using ChloraPrep.  The ventilation of the left lung was discontinued.  A limited posterior lateral thoracotomy was made sparing the serratus anterior muscle.  The pleural space was entered in the fifth costal space preserving the integrity of the ribs.  On exploration, there is no pleural fluid or pleural nodule.  Diaphragm, hilum and mediastinum are normal.  Palpation of the lung revealed the 2 left lower lobe lung lesions.  The left upper lobe lung lesion was too deep to be clearly identified by palpation.    The  inferior pulmonary ligament was mobilized the hilum was dissected circumferentially.   Mediastinal lymph node dissection was performed excising the inferior pulmonary ligament lymph node, subcarinal lymph node, posterior hilar lymph node, AP window lymph nodes, and anterior hilar lymph nodes as well as an interlobar lymph node.  The fissure was entered exposing the pulmonary artery.  Fissure was completed posteriorly with 1 application Stearns 35 vascular stapling device and anteriorly with an application of the Stearns 60 gold stapler device    The branch of the pulmonary artery to the superior segment was dissected circumferentially and stapled with an application of the Stearns 35 vascular.  The basilar trunk was dissected circumferentially and stapled in the similar fashion.  The inferior pulmonary vein was dissected circumferentially and stapled with an Stearns 30 vascular.  The origin of the left lower lobe bronchus was dissected circumferentially and stapled with a TA 30 3.5.  The bronchus was divided distal to the staple line completing the left lower lobectomy.  Bronchial stump was airtight at a pressure of 20 cmH2O. There was an excellent reexpansion of the left upper lobe.  Pro Gel was placed on the fissure.  On-Q catheters were placed.  30 cc of Marcaine 0.5% without epinephrine was injected as intercostal blocks.  Through a separate stab wound, a 28 straight chest was placed with the tip directed towards the apex posteriorly.  The incision was closed with pericostal suture oh Vicryl No. 1, #1 Vicryl on the muscular layers, #2-0 Vicryl on the subcutaneous tissue and the skin closed with INSORB stapler.    ESTIMATED BLOOD LOSS: Minimal    COUNTS: Needle and sponge count is correct    Lilli Hernandez PA-C  was the first assistant during the procedure. Her role as first assistant during the procedure was essential and necessary to accomplishing the steps described in the procedure above, providing exposure,  retraction and handling of the scope.     Baron Cummings MD    07/28

## 2023-07-28 NOTE — ANESTHESIA POSTPROCEDURE EVALUATION
Patient: Erin Casas    Procedure: Procedure(s):  LEFT THORACOTOMY WITH MEDIASINAL LYMPH NODE DISSECTION  Left Lower Lobectomy       Anesthesia Type:  General    Note:  Disposition: Admission   Postop Pain Control: Uneventful            Sign Out: Well controlled pain   PONV: No   Neuro/Psych: Uneventful            Sign Out: Acceptable/Baseline neuro status   Airway/Respiratory: Uneventful            Sign Out: Acceptable/Baseline resp. status   CV/Hemodynamics: Uneventful            Sign Out: Acceptable CV status; No obvious hypovolemia; No obvious fluid overload   Other NRE: NONE   DID A NON-ROUTINE EVENT OCCUR?            Last vitals:  Vitals Value Taken Time   /82 07/28/23 1010   Temp 36.1  C (97  F) 07/28/23 1010   Pulse 67 07/28/23 1020   Resp 18 07/28/23 1020   SpO2 97 % 07/28/23 1021       Electronically Signed By: Torito Moreno MD  July 28, 2023  2:46 PM

## 2023-07-28 NOTE — ANESTHESIA CARE TRANSFER NOTE
Patient: Erin Casas    Procedure: Procedure(s):  LEFT THORACOTOMY WITH MEDIASINAL LYMPH NODE DISSECTION  Left Lower Lobectomy       Diagnosis: Primary adenocarcinoma of lower lobe of left lung (H) [C34.32]  Diagnosis Additional Information: No value filed.    Anesthesia Type:   General     Note:    Oropharynx: oropharynx clear of all foreign objects and spontaneously breathing  Level of Consciousness: awake  Oxygen Supplementation: face mask  Level of Supplemental Oxygen (L/min / FiO2): 8  Independent Airway: airway patency satisfactory and stable  Dentition: dentition unchanged  Vital Signs Stable: post-procedure vital signs reviewed and stable  Report to RN Given: handoff report given  Patient transferred to: PACU    Handoff Report: Identifed the Patient, Identified the Reponsible Provider, Reviewed the pertinent medical history, Discussed the surgical course, Reviewed Intra-OP anesthesia mangement and issues during anesthesia, Set expectations for post-procedure period and Allowed opportunity for questions and acknowledgement of understanding      Vitals:  Vitals Value Taken Time   /82 07/28/23 0921   Temp     Pulse 67 07/28/23 0923   Resp 21 07/28/23 0923   SpO2 100 % 07/28/23 0923   Vitals shown include unvalidated device data.    Electronically Signed By: Lana Solis  July 28, 2023  9:24 AM

## 2023-07-28 NOTE — PLAN OF CARE
Goal Outcome Evaluation:  15-19: A&O x4. VSS on RA. LS diminished, absent LLL. Encouraged IS.Tele SR. CT x1 in place, with intermittent mild air leak/no crepitus-MD aware. Denies SOB/CP. Drsg CDI ex dry old blood. On-Q pump intact. Pain managed with schduled torado. Up to bathroom with 1A/GB/W.

## 2023-07-29 ENCOUNTER — APPOINTMENT (OUTPATIENT)
Dept: GENERAL RADIOLOGY | Facility: CLINIC | Age: 73
DRG: 164 | End: 2023-07-29
Attending: PHYSICIAN ASSISTANT
Payer: MEDICARE

## 2023-07-29 LAB
ANION GAP SERPL CALCULATED.3IONS-SCNC: 7 MMOL/L (ref 7–15)
BUN SERPL-MCNC: 9.3 MG/DL (ref 8–23)
CALCIUM SERPL-MCNC: 8 MG/DL (ref 8.8–10.2)
CHLORIDE SERPL-SCNC: 109 MMOL/L (ref 98–107)
CREAT SERPL-MCNC: 0.79 MG/DL (ref 0.51–0.95)
DEPRECATED HCO3 PLAS-SCNC: 23 MMOL/L (ref 22–29)
GFR SERPL CREATININE-BSD FRML MDRD: 78 ML/MIN/1.73M2
GLUCOSE BLDC GLUCOMTR-MCNC: 88 MG/DL (ref 70–99)
GLUCOSE SERPL-MCNC: 110 MG/DL (ref 70–99)
HGB BLD-MCNC: 11.6 G/DL (ref 11.7–15.7)
POTASSIUM SERPL-SCNC: 4.5 MMOL/L (ref 3.4–5.3)
SODIUM SERPL-SCNC: 139 MMOL/L (ref 136–145)

## 2023-07-29 PROCEDURE — 36415 COLL VENOUS BLD VENIPUNCTURE: CPT | Performed by: PHYSICIAN ASSISTANT

## 2023-07-29 PROCEDURE — 250N000013 HC RX MED GY IP 250 OP 250 PS 637: Performed by: PHYSICIAN ASSISTANT

## 2023-07-29 PROCEDURE — 82310 ASSAY OF CALCIUM: CPT | Performed by: PHYSICIAN ASSISTANT

## 2023-07-29 PROCEDURE — 250N000011 HC RX IP 250 OP 636: Mod: JZ | Performed by: PHYSICIAN ASSISTANT

## 2023-07-29 PROCEDURE — 85018 HEMOGLOBIN: CPT | Performed by: PHYSICIAN ASSISTANT

## 2023-07-29 PROCEDURE — 250N000013 HC RX MED GY IP 250 OP 250 PS 637: Performed by: THORACIC SURGERY (CARDIOTHORACIC VASCULAR SURGERY)

## 2023-07-29 PROCEDURE — 120N000013 HC R&B IMCU

## 2023-07-29 PROCEDURE — 71045 X-RAY EXAM CHEST 1 VIEW: CPT

## 2023-07-29 PROCEDURE — 250N000009 HC RX 250: Performed by: PHYSICIAN ASSISTANT

## 2023-07-29 RX ORDER — ACETAMINOPHEN 500 MG
1000 TABLET ORAL 4 TIMES DAILY
Status: DISCONTINUED | OUTPATIENT
Start: 2023-07-29 | End: 2023-07-31 | Stop reason: HOSPADM

## 2023-07-29 RX ADMIN — CYCLOSPORINE 1 DROP: 0.5 EMULSION OPHTHALMIC at 19:51

## 2023-07-29 RX ADMIN — HYDROMORPHONE HYDROCHLORIDE 1 MG: 2 TABLET ORAL at 21:43

## 2023-07-29 RX ADMIN — IBUPROFEN 600 MG: 600 TABLET ORAL at 21:43

## 2023-07-29 RX ADMIN — POLYETHYLENE GLYCOL 3350 17 G: 17 POWDER, FOR SOLUTION ORAL at 08:52

## 2023-07-29 RX ADMIN — MAGNESIUM HYDROXIDE 30 ML: 400 SUSPENSION ORAL at 17:24

## 2023-07-29 RX ADMIN — KETOROLAC TROMETHAMINE 15 MG: 15 INJECTION, SOLUTION INTRAMUSCULAR; INTRAVENOUS at 04:44

## 2023-07-29 RX ADMIN — BACITRACIN: 500 OINTMENT TOPICAL at 09:15

## 2023-07-29 RX ADMIN — CYCLOSPORINE 1 DROP: 0.5 EMULSION OPHTHALMIC at 08:45

## 2023-07-29 RX ADMIN — ACETAMINOPHEN 975 MG: 325 TABLET, FILM COATED ORAL at 05:58

## 2023-07-29 RX ADMIN — ENOXAPARIN SODIUM 40 MG: 40 INJECTION SUBCUTANEOUS at 08:52

## 2023-07-29 RX ADMIN — IBUPROFEN 600 MG: 600 TABLET ORAL at 17:19

## 2023-07-29 RX ADMIN — IBUPROFEN 600 MG: 600 TABLET ORAL at 11:38

## 2023-07-29 RX ADMIN — ACETAMINOPHEN 1000 MG: 500 TABLET ORAL at 17:19

## 2023-07-29 RX ADMIN — NITROFURANTOIN MACROCRYSTALS 50 MG: 50 CAPSULE ORAL at 08:52

## 2023-07-29 RX ADMIN — FAMOTIDINE 20 MG: 20 TABLET ORAL at 08:52

## 2023-07-29 RX ADMIN — ACETAMINOPHEN 1000 MG: 500 TABLET ORAL at 12:30

## 2023-07-29 RX ADMIN — SENNOSIDES AND DOCUSATE SODIUM 1 TABLET: 50; 8.6 TABLET ORAL at 08:52

## 2023-07-29 RX ADMIN — ACETAMINOPHEN 1000 MG: 500 TABLET ORAL at 21:43

## 2023-07-29 ASSESSMENT — ACTIVITIES OF DAILY LIVING (ADL)
ADLS_ACUITY_SCORE: 25

## 2023-07-29 NOTE — PLAN OF CARE
A&Ox4. VSS on room air. Scheduled tylenol and ibuprofen for L chest/flank/shoulder pain. L chest tube to water seal. Intermittent air leak with cough. Mild krepitus. 220 ml sanguinous output this shift. LS dim/clear. OnQ pump infusing, clamps open & sensors taped. Pt ambulating in halls w/ SBA. Tolerating regular diet. C/o constipation and abdominal fullness - MoM given. + BM. L PIV SL.

## 2023-07-29 NOTE — PLAN OF CARE
Patient is A&Ox 4. Vitals are stable on room air. Tele is SR.  Patient is up with assist of 1, GBW. Sleep Quality is adequate, slept well through night. CT in place, -20 suction with sanguinous output. Small airleak, MD aware. OnQ pump infusing. Purewick in place overnight, but leaked, so output unmeasured. NS running at 75mL/hr through night. Stopped at 0600. Pain managed with scheduled Tylenol and Toradol. Gave PO Dilauded 1x.

## 2023-07-29 NOTE — PROGRESS NOTES
THORACIC SURGERY POD # 1    Discussed findings and procedure  AVSS on RA  Tiny air leak with cough only, no air leak on water seal    CXR small apical space    Satisfactory    CT  to water seal  Ambulate  Resp care ++    RED ALMONTE MD [facility] ONCOLOGY THORACIC SURGERY  CELL:  [phone]  OFFICE: [phone]

## 2023-07-30 ENCOUNTER — APPOINTMENT (OUTPATIENT)
Dept: GENERAL RADIOLOGY | Facility: CLINIC | Age: 73
DRG: 164 | End: 2023-07-30
Attending: PHYSICIAN ASSISTANT
Payer: MEDICARE

## 2023-07-30 LAB — GLUCOSE BLDC GLUCOMTR-MCNC: 91 MG/DL (ref 70–99)

## 2023-07-30 PROCEDURE — 250N000013 HC RX MED GY IP 250 OP 250 PS 637: Performed by: THORACIC SURGERY (CARDIOTHORACIC VASCULAR SURGERY)

## 2023-07-30 PROCEDURE — 71045 X-RAY EXAM CHEST 1 VIEW: CPT

## 2023-07-30 PROCEDURE — 120N000013 HC R&B IMCU

## 2023-07-30 PROCEDURE — 250N000011 HC RX IP 250 OP 636: Mod: JZ | Performed by: PHYSICIAN ASSISTANT

## 2023-07-30 PROCEDURE — 250N000013 HC RX MED GY IP 250 OP 250 PS 637: Performed by: PHYSICIAN ASSISTANT

## 2023-07-30 RX ADMIN — HYDROMORPHONE HYDROCHLORIDE 1 MG: 2 TABLET ORAL at 21:18

## 2023-07-30 RX ADMIN — HYDROMORPHONE HYDROCHLORIDE 1 MG: 2 TABLET ORAL at 04:01

## 2023-07-30 RX ADMIN — CYCLOSPORINE 1 DROP: 0.5 EMULSION OPHTHALMIC at 08:00

## 2023-07-30 RX ADMIN — ENOXAPARIN SODIUM 40 MG: 40 INJECTION SUBCUTANEOUS at 08:00

## 2023-07-30 RX ADMIN — IBUPROFEN 600 MG: 600 TABLET ORAL at 17:29

## 2023-07-30 RX ADMIN — IBUPROFEN 600 MG: 600 TABLET ORAL at 13:33

## 2023-07-30 RX ADMIN — ACETAMINOPHEN 1000 MG: 500 TABLET ORAL at 08:00

## 2023-07-30 RX ADMIN — IBUPROFEN 600 MG: 600 TABLET ORAL at 08:00

## 2023-07-30 RX ADMIN — NITROFURANTOIN MACROCRYSTALS 50 MG: 50 CAPSULE ORAL at 07:59

## 2023-07-30 RX ADMIN — IBUPROFEN 600 MG: 600 TABLET ORAL at 21:19

## 2023-07-30 RX ADMIN — ACETAMINOPHEN 1000 MG: 500 TABLET ORAL at 13:33

## 2023-07-30 RX ADMIN — ACETAMINOPHEN 1000 MG: 500 TABLET ORAL at 21:18

## 2023-07-30 RX ADMIN — FAMOTIDINE 20 MG: 20 TABLET ORAL at 08:00

## 2023-07-30 RX ADMIN — CYCLOSPORINE 1 DROP: 0.5 EMULSION OPHTHALMIC at 21:17

## 2023-07-30 RX ADMIN — ACETAMINOPHEN 1000 MG: 500 TABLET ORAL at 17:29

## 2023-07-30 ASSESSMENT — ACTIVITIES OF DAILY LIVING (ADL)
ADLS_ACUITY_SCORE: 22
ADLS_ACUITY_SCORE: 26
ADLS_ACUITY_SCORE: 22
ADLS_ACUITY_SCORE: 26
ADLS_ACUITY_SCORE: 22
ADLS_ACUITY_SCORE: 26
ADLS_ACUITY_SCORE: 22
ADLS_ACUITY_SCORE: 22
ADLS_ACUITY_SCORE: 26
ADLS_ACUITY_SCORE: 22

## 2023-07-30 NOTE — PLAN OF CARE
Goal Outcome Evaluation:      Plan of Care Reviewed With: patient    Overall Patient Progress: improvingOverall Patient Progress: improving    Orientations: A/Ox4  Vitals/Pain: VSS on RA, pain managed w ibuprofen, tylenol, dilaudid   LS: diminished, congested cough   Lines/Drains: R PIV SL. CT to water seal w sanguinous output   Skin/Wounds: L posterior incision site, L CT, on-q pump patent  GI/: Diarrhea like stools, adequate uo.   Labs: BS (91)  Ambulation/Assist: SBA   Sleep Quality: Good, slept between cares   Plan: Ambulation, breathing exercises, chest xray in AM

## 2023-07-30 NOTE — PROGRESS NOTES
THORACIC SURGERY POD # 2    Doing well  AVSS on  RA  No air leak    CXR  OK    Clamp CT tonight    Most likely D/C  tomorrow    RED ALMONTE MD [facility] ONCOLOGY THORACIC SURGERY  CELL:  [phone]  OFFICE: [phone]

## 2023-07-30 NOTE — PLAN OF CARE
A&Ox4. VSS on room air. Scheduled tylenol and ibuprofen for L shoulder/chest/back pain. Chest tube to water seal. Serosanguinous output. No air leak/very mild crepitus felt thru L mid/upper back. OnQ pump in place - clamps open, sensors taped. LS clear/dim. No PIV access - OK per MD. Up w/ SBA. Ambulating well in halls. Tolerating regular diet. Plan to clamp CT at midnight and get CXR in AM.

## 2023-07-31 ENCOUNTER — APPOINTMENT (OUTPATIENT)
Dept: GENERAL RADIOLOGY | Facility: CLINIC | Age: 73
DRG: 164 | End: 2023-07-31
Attending: PHYSICIAN ASSISTANT
Payer: MEDICARE

## 2023-07-31 VITALS
WEIGHT: 134.48 LBS | HEART RATE: 82 BPM | SYSTOLIC BLOOD PRESSURE: 118 MMHG | OXYGEN SATURATION: 96 % | BODY MASS INDEX: 21.11 KG/M2 | DIASTOLIC BLOOD PRESSURE: 89 MMHG | HEIGHT: 67 IN | RESPIRATION RATE: 16 BRPM | TEMPERATURE: 97.9 F

## 2023-07-31 LAB — PLATELET # BLD AUTO: 248 10E3/UL (ref 150–450)

## 2023-07-31 PROCEDURE — 250N000013 HC RX MED GY IP 250 OP 250 PS 637: Performed by: THORACIC SURGERY (CARDIOTHORACIC VASCULAR SURGERY)

## 2023-07-31 PROCEDURE — 71045 X-RAY EXAM CHEST 1 VIEW: CPT

## 2023-07-31 PROCEDURE — 85049 AUTOMATED PLATELET COUNT: CPT | Performed by: PHYSICIAN ASSISTANT

## 2023-07-31 PROCEDURE — 250N000013 HC RX MED GY IP 250 OP 250 PS 637: Performed by: PHYSICIAN ASSISTANT

## 2023-07-31 PROCEDURE — 88305 TISSUE EXAM BY PATHOLOGIST: CPT | Mod: 26 | Performed by: PATHOLOGY

## 2023-07-31 PROCEDURE — 36415 COLL VENOUS BLD VENIPUNCTURE: CPT | Performed by: PHYSICIAN ASSISTANT

## 2023-07-31 PROCEDURE — 88309 TISSUE EXAM BY PATHOLOGIST: CPT | Mod: 26 | Performed by: PATHOLOGY

## 2023-07-31 PROCEDURE — 250N000011 HC RX IP 250 OP 636: Mod: JZ | Performed by: PHYSICIAN ASSISTANT

## 2023-07-31 PROCEDURE — 88360 TUMOR IMMUNOHISTOCHEM/MANUAL: CPT | Mod: 26 | Performed by: PATHOLOGY

## 2023-07-31 RX ORDER — ACETAMINOPHEN 500 MG
1000 TABLET ORAL 4 TIMES DAILY
Qty: 100 TABLET | Refills: 0 | Status: ON HOLD | OUTPATIENT
Start: 2023-07-31 | End: 2023-09-19

## 2023-07-31 RX ORDER — AMOXICILLIN 250 MG
1-3 CAPSULE ORAL 2 TIMES DAILY PRN
Qty: 30 TABLET | Refills: 0 | Status: ON HOLD | OUTPATIENT
Start: 2023-07-31 | End: 2023-09-19

## 2023-07-31 RX ORDER — HYDROMORPHONE HYDROCHLORIDE 2 MG/1
1-2 TABLET ORAL EVERY 6 HOURS PRN
Qty: 10 TABLET | Refills: 0 | Status: ON HOLD | OUTPATIENT
Start: 2023-07-31 | End: 2023-09-19

## 2023-07-31 RX ORDER — IBUPROFEN 600 MG/1
600 TABLET, FILM COATED ORAL
Qty: 100 TABLET | Refills: 0 | Status: ON HOLD | OUTPATIENT
Start: 2023-07-31 | End: 2023-09-19

## 2023-07-31 RX ADMIN — ENOXAPARIN SODIUM 40 MG: 40 INJECTION SUBCUTANEOUS at 08:15

## 2023-07-31 RX ADMIN — FAMOTIDINE 20 MG: 20 TABLET ORAL at 08:12

## 2023-07-31 RX ADMIN — CYCLOSPORINE 1 DROP: 0.5 EMULSION OPHTHALMIC at 05:41

## 2023-07-31 RX ADMIN — NITROFURANTOIN MACROCRYSTALS 50 MG: 50 CAPSULE ORAL at 08:12

## 2023-07-31 RX ADMIN — IBUPROFEN 600 MG: 600 TABLET ORAL at 08:12

## 2023-07-31 RX ADMIN — ACETAMINOPHEN 1000 MG: 500 TABLET ORAL at 08:12

## 2023-07-31 RX ADMIN — HYDROMORPHONE HYDROCHLORIDE 1 MG: 2 TABLET ORAL at 05:39

## 2023-07-31 ASSESSMENT — ACTIVITIES OF DAILY LIVING (ADL)
ADLS_ACUITY_SCORE: 26

## 2023-07-31 NOTE — PLAN OF CARE
VSS on RA. A/Ox4. Pain managed w/ meds (see MAR). LS dim, denies SOB. IS at bedside, self-administers. PAVAN cherry site WDL. CT removed, dressing CDI. OnQ pump infusing, clamps open and sensors taped. Went over AVS w/ pt and daughters. Questions answered and pt and daughters verbalized understanding. Personal belongings, discharge meds and dressing supplies sent w/ pt. Daughter to transport home.     Goal Outcome Evaluation:      Plan of Care Reviewed With: patient, child    Overall Patient Progress: improving    Outcome Evaluation: Discharged

## 2023-07-31 NOTE — PROGRESS NOTES
"Thoracic Surgery POD #3:  /89 (BP Location: Left arm)   Pulse 82   Temp 97.9  F (36.6  C) (Oral)   Resp 16   Ht 1.702 m (5' 7\")   Wt 61 kg (134 lb 7.7 oz)   SpO2 96%   BMI 21.06 kg/m    CXR: small stable left apical space after CT clamp trial  Final path: pending    S: Sharp severe pain this morning left lateral chest, radiating anteriorly. Not seemingly caused by cough/movement, not associated with dyspnea. Resolved with medication and position change. Fully instructed on wound care, pain mgmt, follow up.  O: Inc: steris intact, no erythema, no swelling  On-Q: infusing  CT: DCed without complication.  Occlusive dressing applied.  P: OK to discharge home today  We will call with time for CXR and post-op appt on 8/7  Will call with final path and staging    Lilli Hernandez PA-C with Dr. [facility]  Cell [phone]      "

## 2023-07-31 NOTE — PLAN OF CARE
Goal Outcome Evaluation:      Plan of Care Reviewed With: patient    Overall Patient Progress: improvingOverall Patient Progress: improving    Orientations: A/Ox4  Vitals/Pain: VSS on RA, pain managed with ibuprofen, tylenol, dilaudid   LS: diminished, fine/coarse crackles in LLL   Lines/Drains: CT clamped at 0000  Skin/Wounds: L posterior thora site, L CT, OnQ pump   GI/: Adequate uo via purewick overnight, BM- BS+   Labs: Platelets (248)  Ambulation/Assist: SBA, walk x2 in the evening   Sleep Quality: Good, slept between cares   Plan: Chest xray, possible dx home

## 2023-07-31 NOTE — DISCHARGE INSTRUCTIONS
"[facility]   Discharge instructions and Follow-Up Care for Dr. Cummings' Thoracoscopy and Thoracotomy patients:       You will receive a call from our , Radha, with details about your post-op chest x-ray and appointment on Monday, August 7. For reference: Johanna [phone] or Radha [phone]    Patient care:  Call Dr. Cummings' office [phone] if you experience:   *Severe chills or fever of 101 F or maria victoria on two occasions   *Severely increased incisional pain that cannot be relieved with rest or pain medications   *Presence of unusual incisional or chest tube site drainage that is odorous, green or yellow in color, hortensia bright red blood or if your incision is warm/red/swollen   *Coughing up bright red blood or greenish-yellow secretions   *Inability to urinate or have a bowel movement   *New pain or swelling in your legs    In an emergency, call [phone] or have someone drive you to an Emergency Department    Pain Relief:  You may take ibuprofen and acetaminophen according to the directions on the medication bottle. (Common commercial names for ibuprofen at Advil or Motrin.  The common commercial name for acetaminophen is Tylenol.) You may also have been prescribed a narcotic pain medicine. Most people get good pain relief by \"staggering\" these medications.     No driving while on narcotic pain medicines.    Activity:  __XXX_No lifting greater than 10 pounds with your operative side arm for the next 4 weeks while you heal    Wound Care:   *Please look at your incisions daily and keep them clean while they heal   *Do not apply creams, salves such as Bacitracin, or ointments on the incisions while they heal   *Steri strips (thin white pieces of tape) will be present on the incision(s) and will peel off as your incision heals-- otherwise, they will be removed at your post-op appointment   *Remove the dressing covering your chest tube site 48 hours after your discharge from " the hospital. You may then shower. Wash the incision and chest tube sites daily with soap and water. No bathing/immersing incisions under water for two weeks or until the chest tube sites are completely healed.   *After your shower, pat the chest tube sites dry and place a dry gauze dressing (and tape) over the sites. It is normal to have some drainage from the sites for a few days. Do not be alarmed if a large amount of fluid drains (should be pink or yellow) either spontaneously or with coughing or exertion. Should this happen, just place a larger, thicker gauze dressing over the chest tube sites. In about a week, drainage should stop and a scab will form. Once drainage stops, you can stop covering the sites. Call our office if the drainage is milky or green in color or foul-smelling.    Respiratory Care:  Utilize the incentive spirometer and flutter valve/acapella (if you received one) several times in a row every few hours while awake for a few weeks after discharging home from the hospital.    Activity:  It may take a few weeks to regain your normal energy level/stamina. It is important during your recovery to get regular physical activity such as walking each day, climbing stairs as tolerated, and avoiding prolonged daytime naps    Return to Work:  Time away from work will depend on your specific situation. In general, you will need between 1-6 weeks to fully recover from surgery. Specific dates for retuning to work can be discussed at your post-op appointment.

## 2023-08-01 LAB
INTERPRETATION: NORMAL
LAB DIRECTOR COMMENTS: NORMAL
LAB DIRECTOR DISCLAIMER: NORMAL
LAB DIRECTOR INTERPRETATION: NORMAL
LAB DIRECTOR METHODOLOGY: NORMAL
LAB DIRECTOR RESULTS: NORMAL
SIGNIFICANT RESULTS: NORMAL
SPECIMEN DESCRIPTION: NORMAL
SPECIMEN DESCRIPTION: NORMAL
TEST DETAILS, MDL: NORMAL

## 2023-08-01 PROCEDURE — 81445 SO NEO GSAP 5-50DNA/DNA&RNA: CPT | Performed by: THORACIC SURGERY (CARDIOTHORACIC VASCULAR SURGERY)

## 2023-08-01 PROCEDURE — G0452 MOLECULAR PATHOLOGY INTERPR: HCPCS | Mod: 26 | Performed by: PATHOLOGY

## 2023-08-01 NOTE — DISCHARGE SUMMARY
THORACIC SURGERY HOSPITAL DISCHARGE SUMMARY  [facility] - THORACIC SURGERY  [address], [address]  [place] [address]  Phone [phone]  [email or web address]    8/1/2023     Sheridan Dominguez   [address] / [facility] [address]  Phone: [phone]   Fax: [phone]        Re: Erin Casas             1950             [id number]              Dates of Hospitalization: 7/28/2023 - 7/31/2023   Date of Service (when I saw the patient): 7/31/23    Dear Dr. Dominguez:     As you are aware, we had the pleasure of caring for your patient,  Erin Casas here at [facility].  She is a  73-year-old woman with lung lung lesions.  The PET scan did not show evidence of issac or distant disease.  There is a biopsy-proven adenocarcinoma towards the base of the left lower lobe lung.  There is a groundglass opacity in the superior segment left lower lobe lung which is suspicious for a second primary adenocarcinoma.  Deep in the left upper lobe lung, there is a third lesion which is biopsy-proven adenocarcinoma and will be treated with SBRT.  There is a faint opacity in the right upper lobe lung that will require ongoing observation.  Options of treatment were discussed.  It was elected to proceed with a left lower lobectomy and mediastinal lymph node dissection.    On 7/28/2023, Dr. Baron Cummings performed the following:    Procedure/Surgery Information   Procedure: Limited left thoracotomy, left lower lobectomy, mediastinal lymph node dissection   Surgeon(s): Surgeon(s) and Role:     * Baron Cummings MD - Primary     * Lilli Hernandez PA-C - Assisting   Specimens: ID Type Source Tests Collected by Time Destination   1 : #9 INFERIOR PULMONARY LIGAMENT NODE Tissue Lymph Node(s) SURGICAL PATHOLOGY EXAM Baron Cummings MD 7/28/2023  7:56 AM    2 : #5 A-P WINDOW NODE Tissue Lymph Node(s) SURGICAL PATHOLOGY EXAM Emiliano  Baron Card MD 7/28/2023  7:57 AM    3 : #7 SUBCARINAL NODE Tissue Lymph Node(s) SURGICAL PATHOLOGY EXAM Baron Cummings MD 7/28/2023  7:59 AM    4 : #10L POSTERIOR HILAR NODE Tissue Lymph Node(s) SURGICAL PATHOLOGY EXAM Baron Cummings MD 7/28/2023  8:02 AM    5 : LEFT LOWER LOBE LUNG Tissue Lung, Lower Lobe, Left SURGICAL PATHOLOGY EXAM Baron Cummings MD 7/28/2023  8:28 AM    6 : #11 INTERLOBAR NODE Tissue Lymph Node(s) SURGICAL PATHOLOGY EXAM Baron Cummings MD 7/28/2023  8:13 AM    7 : #10L ANTERIOR HILAR NODE Tissue Lymph Node(s) SURGICAL PATHOLOGY EXAM Baron Cummings MD 7/28/2023  8:24 AM             Final Surgical Pathology Revealed:  Two separate acinar predominant adenocarcinoma nodules in left lower lobe lung with greatest dimension of 1.8 cm, no visceral pleural invasion, and one left hilar lymph node positive for tumor spread. Final pathologic stage [id number], Final stage IIB.    Her post-operative course was unremarkable.      Consultations This Hospital Stay   None    Erin Casas has otherwise recovered sufficiently to be discharged to **home* today, 7/31/2023, on post-operative day number three for further convalescence.  Her incisions are healing well with no signs or symptoms of infection.  Her bowels have moved sufficiently and she is tolerating diet and activity, ambulating and transferring independently.  She is currently afebrile with stable vital signs.     Below, you will find a full discharge medication list and instructions.  We have arranged for Erin Casas to follow-up with us in our [facility] in 7-10 days with a Chest X-ray prior to that appointment.  We thank you for allowing us to participate in the care of Erin Casas here at [facility].  Please feel free to contact our office at [phone] with any questions or concerns or if we can be of any further assistance in the care of this  patient.    Sincerely,    Dr. Baron Cummings MD    D/C Summary Prepared by: RAFIA SutherlandC    Discharge Medications:  Discharge Medication List as of 7/31/2023 10:48 AM      START taking these medications    Details   HYDROmorphone (DILAUDID) 2 MG tablet Take 0.5-1 tablets (1-2 mg) by mouth every 6 hours as needed for moderate pain, Disp-10 tablet, R-0, E-Prescribe      ibuprofen (ADVIL/MOTRIN) 600 MG tablet Take 1 tablet (600 mg) by mouth 4 times daily (before meals and nightly), Disp-100 tablet, R-0, E-Prescribe      senna-docusate (SENOKOT-S/PERICOLACE) 8.6-50 MG tablet Take 1-3 tablets by mouth 2 times daily as needed for constipation, Disp-30 tablet, R-0, E-Prescribe         CONTINUE these medications which have CHANGED    Details   acetaminophen (TYLENOL) 500 MG tablet Take 2 tablets (1,000 mg) by mouth 4 times daily, Disp-100 tablet, R-0, E-Prescribe         CONTINUE these medications which have NOT CHANGED    Details   alendronate (FOSAMAX) 70 MG tablet Take 1 tablet by mouth every 7 days Sundays, Historical      cetirizine (ZYRTEC) 10 MG tablet Take 1 tablet by mouth as needed for allergies, Historical      cycloSPORINE (RESTASIS) 0.05 % ophthalmic emulsion Place 1 drop into both eyes 2 times daily, Historical      Fiber Select Gummies CHEW Take 2 chew tab by mouth every morning, Historical      Grape Seed Extract 100 MG CAPS Take 1 capsule by mouth every evening, Historical      Milk Thistle 150 MG CAPS Take 1 capsule by mouth every morning, Historical      nitroFURantoin macrocrystal (MACRODANTIN) 50 MG capsule Take 1 capsule by mouth every morning, Historical      Probiotic Product (PROBIOTIC PO) Take 1 tablet by mouth every morning, Historical      vitamin C (ASCORBIC ACID) 1000 MG TABS Take 1 tablet by mouth every evening, Historical      Vitamin D, Cholecalciferol, 25 MCG (1000 UT) CAPS Take 1 capsule by mouth every evening, Historical      zinc gluconate 50 MG tablet Take 1 tablet by mouth every  evening, Historical      zolpidem (AMBIEN) 5 MG tablet Take 1 tablet by mouth nightly as needed for sleep (only for traveling), Historical               Discharge Instructions:  1) Remove chest tube dressing on 8/02/23 and then it is Ok to shower.  Please wash both incision and chest tube site daily with soap and water.  You may cover the chest tube site daily with a clean band-aid or dry gauze if it continues to drain.  Once it stops draining, leave the site open to air and it will form a scab.  2) Steri-strips can be removed in 1 week or they will fall off when they are ready.  3) Continue daily use of your Incentive Spirometer, set of 10x in a row, every 1-2 hours while you are awake during the day. Also use your flutter valve if you received one during your stay.   4) No lifting, pushing or pulling >10 lbs for 4 weeks from the day of your surgery.    5) No driving while on narcotic pain medications.    Follow-Up Care:  1) Follow up with Lilli Hernandez PA-C/Dr. Cummings at the [facility] in [place] ([address] [place] [address], [address], [place] [address]).  Call Johanna at [phone] to schedule the appointment.  2) Follow up with Primary Care Provider, Sheridan Dominguez within 1 month of discharge for routine post-surgical care, wound check and follow up.  Please call [phone] to arrange this appointment.       CC  Patient Care Team:  Sheridan Dominguez MD as PCP - General (Internal Medicine)

## 2023-08-02 LAB
PATH REPORT.ADDENDUM SPEC: ABNORMAL
PATH REPORT.COMMENTS IMP SPEC: ABNORMAL
PATH REPORT.COMMENTS IMP SPEC: ABNORMAL
PATH REPORT.COMMENTS IMP SPEC: YES
PATH REPORT.FINAL DX SPEC: ABNORMAL
PATH REPORT.GROSS SPEC: ABNORMAL
PATH REPORT.MICROSCOPIC SPEC OTHER STN: ABNORMAL
PATH REPORT.RELEVANT HX SPEC: ABNORMAL
PATHOLOGY SYNOPTIC REPORT: ABNORMAL
PHOTO IMAGE: ABNORMAL

## 2023-08-02 PROCEDURE — G0452 MOLECULAR PATHOLOGY INTERPR: HCPCS | Mod: 26 | Performed by: PATHOLOGY

## 2023-08-02 PROCEDURE — 81456 SO/HL 51/>GSAP RNA ALYS: CPT | Performed by: THORACIC SURGERY (CARDIOTHORACIC VASCULAR SURGERY)

## 2023-08-04 ENCOUNTER — ANCILLARY PROCEDURE (OUTPATIENT)
Dept: GENERAL RADIOLOGY | Facility: CLINIC | Age: 73
End: 2023-08-04
Attending: THORACIC SURGERY (CARDIOTHORACIC VASCULAR SURGERY)
Payer: MEDICARE

## 2023-08-04 DIAGNOSIS — C34.12 SQUAMOUS CELL CARCINOMA OF BRONCHUS IN LEFT UPPER LOBE (H): ICD-10-CM

## 2023-08-04 LAB — RADIOLOGIST FLAGS: ABNORMAL

## 2023-08-04 PROCEDURE — 71046 X-RAY EXAM CHEST 2 VIEWS: CPT

## 2023-08-07 ENCOUNTER — ANCILLARY PROCEDURE (OUTPATIENT)
Dept: GENERAL RADIOLOGY | Facility: CLINIC | Age: 73
End: 2023-08-07
Attending: THORACIC SURGERY (CARDIOTHORACIC VASCULAR SURGERY)
Payer: MEDICARE

## 2023-08-07 DIAGNOSIS — C34.32 PRIMARY MALIGNANT NEOPLASM OF BRONCHUS OF LEFT LOWER LOBE (H): ICD-10-CM

## 2023-08-07 PROCEDURE — 71046 X-RAY EXAM CHEST 2 VIEWS: CPT

## 2023-08-15 ENCOUNTER — LAB REQUISITION (OUTPATIENT)
Dept: LAB | Facility: CLINIC | Age: 73
End: 2023-08-15
Payer: MEDICARE

## 2023-08-15 ENCOUNTER — ANCILLARY PROCEDURE (OUTPATIENT)
Dept: ULTRASOUND IMAGING | Facility: CLINIC | Age: 73
End: 2023-08-15
Attending: THORACIC SURGERY (CARDIOTHORACIC VASCULAR SURGERY)
Payer: MEDICARE

## 2023-08-15 DIAGNOSIS — R94.5 NONSPECIFIC ABNORMAL RESULTS OF LIVER FUNCTION STUDY: ICD-10-CM

## 2023-08-15 DIAGNOSIS — R94.5 ABNORMAL RESULTS OF LIVER FUNCTION STUDIES: ICD-10-CM

## 2023-08-15 LAB
ALBUMIN SERPL BCG-MCNC: 4.2 G/DL (ref 3.5–5.2)
ALP SERPL-CCNC: 577 U/L (ref 35–104)
ALT SERPL W P-5'-P-CCNC: 829 U/L (ref 0–50)
AMYLASE SERPL-CCNC: 81 U/L (ref 28–100)
AST SERPL W P-5'-P-CCNC: 489 U/L (ref 0–45)
BILIRUB DIRECT SERPL-MCNC: 2.94 MG/DL (ref 0–0.3)
BILIRUB SERPL-MCNC: 3.4 MG/DL
LIPASE SERPL-CCNC: 152 U/L (ref 13–60)
PROT SERPL-MCNC: 6.9 G/DL (ref 6.4–8.3)

## 2023-08-15 PROCEDURE — 36415 COLL VENOUS BLD VENIPUNCTURE: CPT | Mod: ORL | Performed by: THORACIC SURGERY (CARDIOTHORACIC VASCULAR SURGERY)

## 2023-08-15 PROCEDURE — 83690 ASSAY OF LIPASE: CPT | Mod: ORL | Performed by: THORACIC SURGERY (CARDIOTHORACIC VASCULAR SURGERY)

## 2023-08-15 PROCEDURE — 76705 ECHO EXAM OF ABDOMEN: CPT

## 2023-08-15 PROCEDURE — 80076 HEPATIC FUNCTION PANEL: CPT | Mod: ORL | Performed by: THORACIC SURGERY (CARDIOTHORACIC VASCULAR SURGERY)

## 2023-08-15 PROCEDURE — 82150 ASSAY OF AMYLASE: CPT | Mod: ORL | Performed by: THORACIC SURGERY (CARDIOTHORACIC VASCULAR SURGERY)

## 2023-08-16 ENCOUNTER — TRANSFERRED RECORDS (OUTPATIENT)
Dept: HEALTH INFORMATION MANAGEMENT | Facility: CLINIC | Age: 73
End: 2023-08-16
Payer: MEDICARE

## 2023-08-19 ENCOUNTER — HEALTH MAINTENANCE LETTER (OUTPATIENT)
Age: 73
End: 2023-08-19

## 2023-08-21 ENCOUNTER — ANCILLARY PROCEDURE (OUTPATIENT)
Dept: GENERAL RADIOLOGY | Facility: CLINIC | Age: 73
End: 2023-08-21
Attending: THORACIC SURGERY (CARDIOTHORACIC VASCULAR SURGERY)
Payer: MEDICARE

## 2023-08-21 ENCOUNTER — LAB REQUISITION (OUTPATIENT)
Dept: LAB | Facility: CLINIC | Age: 73
End: 2023-08-21
Payer: MEDICARE

## 2023-08-21 DIAGNOSIS — R94.5 ABNORMAL RESULTS OF LIVER FUNCTION STUDIES: ICD-10-CM

## 2023-08-21 DIAGNOSIS — C34.12 SQUAMOUS CELL CARCINOMA OF BRONCHUS IN LEFT UPPER LOBE (H): ICD-10-CM

## 2023-08-21 LAB
ALBUMIN SERPL BCG-MCNC: 3.8 G/DL (ref 3.5–5.2)
ALP SERPL-CCNC: 478 U/L (ref 35–104)
ALT SERPL W P-5'-P-CCNC: 406 U/L (ref 0–50)
AMYLASE SERPL-CCNC: 83 U/L (ref 28–100)
AST SERPL W P-5'-P-CCNC: 199 U/L (ref 0–45)
BILIRUB DIRECT SERPL-MCNC: 4.03 MG/DL (ref 0–0.3)
BILIRUB SERPL-MCNC: 5.8 MG/DL
LIPASE SERPL-CCNC: 126 U/L (ref 13–60)
PROT SERPL-MCNC: 6.8 G/DL (ref 6.4–8.3)

## 2023-08-21 PROCEDURE — 71046 X-RAY EXAM CHEST 2 VIEWS: CPT

## 2023-08-21 PROCEDURE — 82150 ASSAY OF AMYLASE: CPT | Mod: ORL | Performed by: THORACIC SURGERY (CARDIOTHORACIC VASCULAR SURGERY)

## 2023-08-21 PROCEDURE — 83690 ASSAY OF LIPASE: CPT | Mod: ORL | Performed by: THORACIC SURGERY (CARDIOTHORACIC VASCULAR SURGERY)

## 2023-08-21 PROCEDURE — 80076 HEPATIC FUNCTION PANEL: CPT | Mod: ORL | Performed by: THORACIC SURGERY (CARDIOTHORACIC VASCULAR SURGERY)

## 2023-08-30 ENCOUNTER — LAB REQUISITION (OUTPATIENT)
Dept: LAB | Facility: CLINIC | Age: 73
End: 2023-08-30
Payer: MEDICARE

## 2023-08-30 DIAGNOSIS — R94.5 ABNORMAL RESULTS OF LIVER FUNCTION STUDIES: ICD-10-CM

## 2023-08-30 LAB
ALBUMIN SERPL BCG-MCNC: 4 G/DL (ref 3.5–5.2)
ALP SERPL-CCNC: 248 U/L (ref 35–104)
ALT SERPL W P-5'-P-CCNC: 134 U/L (ref 0–50)
AST SERPL W P-5'-P-CCNC: 50 U/L (ref 0–45)
BILIRUB DIRECT SERPL-MCNC: 0.67 MG/DL (ref 0–0.3)
BILIRUB SERPL-MCNC: 1.4 MG/DL
PROT SERPL-MCNC: 6.9 G/DL (ref 6.4–8.3)

## 2023-08-30 PROCEDURE — 84450 TRANSFERASE (AST) (SGOT): CPT | Mod: ORL | Performed by: THORACIC SURGERY (CARDIOTHORACIC VASCULAR SURGERY)

## 2023-08-30 PROCEDURE — 80076 HEPATIC FUNCTION PANEL: CPT | Mod: ORL

## 2023-09-18 ENCOUNTER — TRANSFERRED RECORDS (OUTPATIENT)
Dept: HEALTH INFORMATION MANAGEMENT | Facility: CLINIC | Age: 73
End: 2023-09-18
Payer: MEDICARE

## 2023-09-18 ENCOUNTER — ANESTHESIA EVENT (OUTPATIENT)
Dept: SURGERY | Facility: CLINIC | Age: 73
End: 2023-09-18
Payer: MEDICARE

## 2023-09-18 NOTE — ANESTHESIA PREPROCEDURE EVALUATION
Anesthesia Pre-Procedure Evaluation    Patient: Erin Casas   MRN: [id number] : 1950        Procedure : Procedure(s):  PORT PLACEMENT          Past Medical History:   Diagnosis Date    Abdominal bloating     Adenocarcinoma of left lung (H)     Depression     Midline thoracic back pain     Mononeuritis     Osteoporosis     Sigmoid diverticulosis       Past Surgical History:   Procedure Laterality Date    HERNIA REPAIR      LOBECTOMY LUNG Left 2023    Procedure: Left Lower Lobectomy;  Surgeon: Baron Cummings MD;  Location:  OR    pr anesthesia of shoulder for impingement, bilateral      THORACOTOMY Left 2023    Procedure: LEFT THORACOTOMY WITH MEDIASINAL LYMPH NODE DISSECTION;  Surgeon: Baron Cummings MD;  Location: [facility] OR      No Known Allergies   Social History     Tobacco Use    Smoking status: Former     Packs/day: 0.50     Years: 10.00     Pack years: 5.00     Types: Cigarettes     Start date:      Quit date:      Years since quittin.7    Smokeless tobacco: Never   Substance Use Topics    Alcohol use: Yes     Comment: 7 glasses of wine per week      Wt Readings from Last 1 Encounters:   23 61 kg (134 lb 7.7 oz)        Anesthesia Evaluation   Pt has had prior anesthetic.     No history of anesthetic complications       ROS/MED HX  ENT/Pulmonary: Comment: S/p lobectomy      Neurologic: Comment: Chronic back pain      Cardiovascular:  - neg cardiovascular ROS     METS/Exercise Tolerance:     Hematologic:  - neg hematologic  ROS     Musculoskeletal: Comment: Osteoporosis  H/o bilateral shoulder surgeries      GI/Hepatic: Comment: Sigmoid diverticulosis     MR abdomen  IMPRESSION :   1. Non cirrhotic liver morphology with stable cysts but no other suspicious lesions.   2. No intra or extrahepatic ductal dilatation.   3. Incidental lesion in the left kidney likely a stable Bosniak type 2 cyst.       Renal/Genitourinary:  - neg Renal ROS     Endo:  - neg  endo ROS     Psychiatric/Substance Use:  - neg psychiatric ROS     Infectious Disease:  - neg infectious disease ROS     Malignancy: Comment: Left upper lobe adenocarcinoma - s/p left lower lobectomy  (+) Malignancy,     Other: Comment: Recent weight loss           Physical Exam    Airway  airway exam normal      Mallampati: II   TM distance: > 3 FB   Neck ROM: full   Mouth opening: > 3 cm    Respiratory Devices and Support         Dental       (+) Minor Abnormalities - some fillings, tiny chips      Cardiovascular   cardiovascular exam normal          Pulmonary   pulmonary exam normal                OUTSIDE LABS:  CBC:   Lab Results   Component Value Date    WBC 3.4 (L) 07/28/2023    HGB 11.6 (L) 07/29/2023    HGB 13.4 07/28/2023    HCT 40.3 07/28/2023     07/31/2023     07/28/2023     BMP:   Lab Results   Component Value Date     07/29/2023     07/28/2023    POTASSIUM 4.5 07/29/2023    POTASSIUM 4.6 07/28/2023    CHLORIDE 109 (H) 07/29/2023    CHLORIDE 106 07/28/2023    CO2 23 07/29/2023    CO2 24 07/28/2023    BUN 9.3 07/29/2023    BUN 12.4 07/28/2023    CR 0.79 07/29/2023    CR 0.82 07/28/2023    GLC 91 07/30/2023    GLC 88 07/29/2023     COAGS:   Lab Results   Component Value Date    INR 0.97 07/28/2023     POC: No results found for: BGM, HCG, HCGS  HEPATIC:   Lab Results   Component Value Date    ALBUMIN 4.0 08/30/2023    PROTTOTAL 6.9 08/30/2023     (H) 08/30/2023    AST 50 (H) 08/30/2023    ALKPHOS 248 (H) 08/30/2023    BILITOTAL 1.4 (H) 08/30/2023     OTHER:   Lab Results   Component Value Date    CARLOS 8.0 (L) 07/29/2023    LIPASE 126 (H) 08/21/2023    AMYLASE 83 08/21/2023       Anesthesia Plan    ASA Status:  2    NPO Status:  NPO Appropriate    Anesthesia Type: MAC.     - Reason for MAC: straight local not clinically adequate   Induction: Propofol.           Consents    Anesthesia Plan(s) and associated risks, benefits, and realistic alternatives discussed. Questions  answered and patient/representative(s) expressed understanding.     - Discussed:     - Discussed with:  Patient            Postoperative Care    Pain management: IV analgesics.   PONV prophylaxis: Ondansetron (or other 5HT-3)     Comments:                Keegan Mendosa MD

## 2023-09-19 ENCOUNTER — ANESTHESIA (OUTPATIENT)
Dept: SURGERY | Facility: CLINIC | Age: 73
End: 2023-09-19
Payer: MEDICARE

## 2023-09-19 ENCOUNTER — APPOINTMENT (OUTPATIENT)
Dept: GENERAL RADIOLOGY | Facility: CLINIC | Age: 73
End: 2023-09-19
Attending: THORACIC SURGERY (CARDIOTHORACIC VASCULAR SURGERY)
Payer: MEDICARE

## 2023-09-19 ENCOUNTER — HOSPITAL ENCOUNTER (OUTPATIENT)
Facility: CLINIC | Age: 73
Discharge: HOME | End: 2023-09-19
Attending: THORACIC SURGERY (CARDIOTHORACIC VASCULAR SURGERY) | Admitting: THORACIC SURGERY (CARDIOTHORACIC VASCULAR SURGERY)
Payer: MEDICARE

## 2023-09-19 VITALS
WEIGHT: 126 LBS | OXYGEN SATURATION: 97 % | RESPIRATION RATE: 16 BRPM | DIASTOLIC BLOOD PRESSURE: 64 MMHG | BODY MASS INDEX: 19.78 KG/M2 | HEIGHT: 67 IN | TEMPERATURE: 97.8 F | HEART RATE: 71 BPM | SYSTOLIC BLOOD PRESSURE: 102 MMHG

## 2023-09-19 PROCEDURE — 258N000003 HC RX IP 258 OP 636

## 2023-09-19 PROCEDURE — 272N000001 HC OR GENERAL SUPPLY STERILE: Performed by: THORACIC SURGERY (CARDIOTHORACIC VASCULAR SURGERY)

## 2023-09-19 PROCEDURE — 710N000012 HC RECOVERY PHASE 2, PER MINUTE: Performed by: THORACIC SURGERY (CARDIOTHORACIC VASCULAR SURGERY)

## 2023-09-19 PROCEDURE — 250N000011 HC RX IP 250 OP 636: Mod: JZ

## 2023-09-19 PROCEDURE — 250N000011 HC RX IP 250 OP 636: Performed by: THORACIC SURGERY (CARDIOTHORACIC VASCULAR SURGERY)

## 2023-09-19 PROCEDURE — C1788 PORT, INDWELLING, IMP: HCPCS | Performed by: THORACIC SURGERY (CARDIOTHORACIC VASCULAR SURGERY)

## 2023-09-19 PROCEDURE — 250N000009 HC RX 250

## 2023-09-19 PROCEDURE — 999N000141 HC STATISTIC PRE-PROCEDURE NURSING ASSESSMENT: Performed by: THORACIC SURGERY (CARDIOTHORACIC VASCULAR SURGERY)

## 2023-09-19 PROCEDURE — 999N000063 XR CHEST PORT 1 VIEW

## 2023-09-19 PROCEDURE — 250N000009 HC RX 250: Performed by: THORACIC SURGERY (CARDIOTHORACIC VASCULAR SURGERY)

## 2023-09-19 PROCEDURE — 258N000003 HC RX IP 258 OP 636: Performed by: THORACIC SURGERY (CARDIOTHORACIC VASCULAR SURGERY)

## 2023-09-19 PROCEDURE — 710N000009 HC RECOVERY PHASE 1, LEVEL 1, PER MIN: Performed by: THORACIC SURGERY (CARDIOTHORACIC VASCULAR SURGERY)

## 2023-09-19 PROCEDURE — 360N000082 HC SURGERY LEVEL 2 W/ FLUORO, PER MIN: Performed by: THORACIC SURGERY (CARDIOTHORACIC VASCULAR SURGERY)

## 2023-09-19 PROCEDURE — 370N000017 HC ANESTHESIA TECHNICAL FEE, PER MIN: Performed by: THORACIC SURGERY (CARDIOTHORACIC VASCULAR SURGERY)

## 2023-09-19 DEVICE — POWERPORT CLEARVUE ISP IMPLANTABLE PORT WITH ATTACHABLE 8F POLYURETHANE OPEN-ENDED SINGLE-LUMEN VENOUS CATHETER PROCEDURAL KIT
Type: IMPLANTABLE DEVICE | Site: CHEST | Status: FUNCTIONAL
Brand: POWERPORT CLEARVUE

## 2023-09-19 RX ORDER — LIDOCAINE HYDROCHLORIDE 20 MG/ML
INJECTION, SOLUTION INFILTRATION; PERINEURAL PRN
Status: DISCONTINUED | OUTPATIENT
Start: 2023-09-19 | End: 2023-09-19

## 2023-09-19 RX ORDER — ONDANSETRON 4 MG/1
4 TABLET, ORALLY DISINTEGRATING ORAL EVERY 30 MIN PRN
Status: CANCELLED | OUTPATIENT
Start: 2023-09-19

## 2023-09-19 RX ORDER — HYDROCODONE BITARTRATE AND ACETAMINOPHEN 5; 325 MG/1; MG/1
1 TABLET ORAL
Status: DISCONTINUED | OUTPATIENT
Start: 2023-09-19 | End: 2023-09-19 | Stop reason: HOSPADM

## 2023-09-19 RX ORDER — PROPOFOL 10 MG/ML
INJECTION, EMULSION INTRAVENOUS CONTINUOUS PRN
Status: DISCONTINUED | OUTPATIENT
Start: 2023-09-19 | End: 2023-09-19

## 2023-09-19 RX ORDER — LIDOCAINE HYDROCHLORIDE 10 MG/ML
INJECTION, SOLUTION INFILTRATION; PERINEURAL PRN
Status: DISCONTINUED | OUTPATIENT
Start: 2023-09-19 | End: 2023-09-19 | Stop reason: HOSPADM

## 2023-09-19 RX ORDER — FENTANYL CITRATE 0.05 MG/ML
50 INJECTION, SOLUTION INTRAMUSCULAR; INTRAVENOUS EVERY 5 MIN PRN
Status: DISCONTINUED | OUTPATIENT
Start: 2023-09-19 | End: 2023-09-19 | Stop reason: HOSPADM

## 2023-09-19 RX ORDER — PROPOFOL 10 MG/ML
INJECTION, EMULSION INTRAVENOUS PRN
Status: DISCONTINUED | OUTPATIENT
Start: 2023-09-19 | End: 2023-09-19

## 2023-09-19 RX ORDER — HEPARIN SODIUM (PORCINE) LOCK FLUSH IV SOLN 100 UNIT/ML 100 UNIT/ML
SOLUTION INTRAVENOUS PRN
Status: DISCONTINUED | OUTPATIENT
Start: 2023-09-19 | End: 2023-09-19 | Stop reason: HOSPADM

## 2023-09-19 RX ORDER — CEFAZOLIN SODIUM/WATER 2 G/20 ML
2 SYRINGE (ML) INTRAVENOUS
Status: COMPLETED | OUTPATIENT
Start: 2023-09-19 | End: 2023-09-19

## 2023-09-19 RX ORDER — ONDANSETRON 4 MG/1
4 TABLET, ORALLY DISINTEGRATING ORAL EVERY 30 MIN PRN
Status: DISCONTINUED | OUTPATIENT
Start: 2023-09-19 | End: 2023-09-19 | Stop reason: HOSPADM

## 2023-09-19 RX ORDER — FENTANYL CITRATE 50 UG/ML
INJECTION, SOLUTION INTRAMUSCULAR; INTRAVENOUS PRN
Status: DISCONTINUED | OUTPATIENT
Start: 2023-09-19 | End: 2023-09-19

## 2023-09-19 RX ORDER — FENTANYL CITRATE 0.05 MG/ML
25 INJECTION, SOLUTION INTRAMUSCULAR; INTRAVENOUS EVERY 5 MIN PRN
Status: DISCONTINUED | OUTPATIENT
Start: 2023-09-19 | End: 2023-09-19 | Stop reason: HOSPADM

## 2023-09-19 RX ORDER — ONDANSETRON 2 MG/ML
4 INJECTION INTRAMUSCULAR; INTRAVENOUS EVERY 30 MIN PRN
Status: CANCELLED | OUTPATIENT
Start: 2023-09-19

## 2023-09-19 RX ORDER — HYDROMORPHONE HCL IN WATER/PF 6 MG/30 ML
0.4 PATIENT CONTROLLED ANALGESIA SYRINGE INTRAVENOUS EVERY 5 MIN PRN
Status: DISCONTINUED | OUTPATIENT
Start: 2023-09-19 | End: 2023-09-19 | Stop reason: HOSPADM

## 2023-09-19 RX ORDER — HYDROMORPHONE HCL IN WATER/PF 6 MG/30 ML
0.2 PATIENT CONTROLLED ANALGESIA SYRINGE INTRAVENOUS EVERY 5 MIN PRN
Status: DISCONTINUED | OUTPATIENT
Start: 2023-09-19 | End: 2023-09-19 | Stop reason: HOSPADM

## 2023-09-19 RX ORDER — ONDANSETRON 2 MG/ML
4 INJECTION INTRAMUSCULAR; INTRAVENOUS EVERY 30 MIN PRN
Status: DISCONTINUED | OUTPATIENT
Start: 2023-09-19 | End: 2023-09-19 | Stop reason: HOSPADM

## 2023-09-19 RX ORDER — ACETAMINOPHEN 325 MG/1
650 TABLET ORAL
Status: DISCONTINUED | OUTPATIENT
Start: 2023-09-19 | End: 2023-09-19 | Stop reason: HOSPADM

## 2023-09-19 RX ORDER — ONDANSETRON 2 MG/ML
INJECTION INTRAMUSCULAR; INTRAVENOUS PRN
Status: DISCONTINUED | OUTPATIENT
Start: 2023-09-19 | End: 2023-09-19

## 2023-09-19 RX ORDER — SODIUM CHLORIDE, SODIUM LACTATE, POTASSIUM CHLORIDE, CALCIUM CHLORIDE 600; 310; 30; 20 MG/100ML; MG/100ML; MG/100ML; MG/100ML
INJECTION, SOLUTION INTRAVENOUS CONTINUOUS
Status: DISCONTINUED | OUTPATIENT
Start: 2023-09-19 | End: 2023-09-19 | Stop reason: HOSPADM

## 2023-09-19 RX ORDER — SODIUM CHLORIDE, SODIUM LACTATE, POTASSIUM CHLORIDE, CALCIUM CHLORIDE 600; 310; 30; 20 MG/100ML; MG/100ML; MG/100ML; MG/100ML
INJECTION, SOLUTION INTRAVENOUS CONTINUOUS PRN
Status: DISCONTINUED | OUTPATIENT
Start: 2023-09-19 | End: 2023-09-19

## 2023-09-19 RX ADMIN — Medication 2 G: at 08:00

## 2023-09-19 RX ADMIN — SODIUM CHLORIDE, POTASSIUM CHLORIDE, SODIUM LACTATE AND CALCIUM CHLORIDE: 600; 310; 30; 20 INJECTION, SOLUTION INTRAVENOUS at 08:06

## 2023-09-19 RX ADMIN — ONDANSETRON 4 MG: 2 INJECTION INTRAMUSCULAR; INTRAVENOUS at 08:31

## 2023-09-19 RX ADMIN — PROPOFOL 20 MG: 10 INJECTION, EMULSION INTRAVENOUS at 08:10

## 2023-09-19 RX ADMIN — PROPOFOL 30 MG: 10 INJECTION, EMULSION INTRAVENOUS at 08:13

## 2023-09-19 RX ADMIN — PHENYLEPHRINE HYDROCHLORIDE 50 MCG: 10 INJECTION INTRAVENOUS at 08:28

## 2023-09-19 RX ADMIN — PROPOFOL 250 MCG/KG/MIN: 10 INJECTION, EMULSION INTRAVENOUS at 08:10

## 2023-09-19 RX ADMIN — LIDOCAINE HYDROCHLORIDE 40 MG: 20 INJECTION, SOLUTION INFILTRATION; PERINEURAL at 08:10

## 2023-09-19 RX ADMIN — FENTANYL CITRATE 50 MCG: 50 INJECTION, SOLUTION INTRAMUSCULAR; INTRAVENOUS at 08:20

## 2023-09-19 ASSESSMENT — ACTIVITIES OF DAILY LIVING (ADL)
ADLS_ACUITY_SCORE: 35
ADLS_ACUITY_SCORE: 35

## 2023-09-19 NOTE — ANESTHESIA POSTPROCEDURE EVALUATION
Patient: Erin Casas    Procedure: Procedure(s):  PORT PLACEMENT       Anesthesia Type:  MAC    Note:  Disposition: Outpatient   Postop Pain Control: Uneventful            Sign Out: Well controlled pain   PONV: No   Neuro/Psych: Uneventful            Sign Out: Acceptable/Baseline neuro status   Airway/Respiratory: Uneventful            Sign Out: Acceptable/Baseline resp. status   CV/Hemodynamics: Uneventful            Sign Out: Acceptable CV status; No obvious hypovolemia; No obvious fluid overload   Other NRE: NONE   DID A NON-ROUTINE EVENT OCCUR? No           Last vitals:  Vitals Value Taken Time   BP 99/56 09/19/23 0915   Temp     Pulse 48 09/19/23 0922   Resp 20 09/19/23 0922   SpO2 100 % 09/19/23 0922   Vitals shown include unvalidated device data.    Electronically Signed By: Keegan Mendosa MD  September 19, 2023  9:24 AM

## 2023-09-19 NOTE — OP NOTE
DATE OF PROCEDURE:  September 19, 2023      SURGEON:  Baron Cummings MD   FIRST ASSIST: Lilli Hernandez PA-C      PREOPERATIVE DIAGNOSIS:  lung cancer      POSTOPERATIVE DIAGNOSIS:  Same       PROCEDURE:  Placement of [place] PowerPort implantable port, left subclavian vein.       ANESTHESIA:  Local with lidocaine 1% without epinephrine and sedation.       INDICATIONS:  Patient will undergo chemotherapy and a PowerPort is indicated for venous access.       DESCRIPTION OF PROCEDURE:  The patient was brought to the OR and placed in supine position.  The patient was placed into Trendelenburg.  IV sedation was given.  The neck and upper chest were prepared and draped in the usual fashion using ChloraPrep.  Local anesthesia was performed with lidocaine 1% without epinephrine. The left subclavian vein was punctured easily with a 16-gauge needle on the first attempt.  There was excellent blood return.  A guidewire was advanced through the needle without any resistance.  The needle was removed.  A transverse incision was made along the guidewire.  Subcutaneous pocket was made inferior to the incision.  Hemostasis was verified and was excellent.  An introducer with a peel-away sheath was introduced into the vein over the guidewire.  Guidewire and introducer were then removed.  The catheter was advanced through the peel-away sheath without resistance. The peel-away sheath was removed.  The catheter was placed at 19.5 cm at the skin level.  The catheter was cut and connected to the port.  The port was placed in the subcutaneous pocket.  The port was accessed with a non-coring needle.  There was excellent blood return, PowerPort was finally flushed with 10 mL of solution of heparin flush.  The incision was closed in the usual fashion.  A chest x-ray performed in the operating room and showed the catheter was in excellent position and the PowerPort is ready to be used.           BARON CUMMINGS MD

## 2023-09-19 NOTE — ANESTHESIA CARE TRANSFER NOTE
Patient: Erin Casas    Procedure: Procedure(s):  PORT PLACEMENT       Diagnosis: Non-small cell lung cancer (H) [C34.90]  Diagnosis Additional Information: No value filed.    Anesthesia Type:   MAC     Note:    Oropharynx: oropharynx clear of all foreign objects and spontaneously breathing  Level of Consciousness: awake  Oxygen Supplementation: room air    Independent Airway: airway patency satisfactory and stable  Dentition: dentition unchanged  Vital Signs Stable: post-procedure vital signs reviewed and stable  Report to RN Given: handoff report given  Patient transferred to: PACU    Handoff Report: Identifed the Patient, Identified the Reponsible Provider, Reviewed the pertinent medical history, Discussed the surgical course, Reviewed Intra-OP anesthesia mangement and issues during anesthesia, Set expectations for post-procedure period and Allowed opportunity for questions and acknowledgement of understanding      Vitals:  Vitals Value Taken Time   BP 98/61 09/19/23 0846   Temp     Pulse 56 09/19/23 0847   Resp 13 09/19/23 0847   SpO2 100 % 09/19/23 0847       Electronically Signed By: SOFIE Moyer CRNA  September 19, 2023  8:49 AM

## 2023-09-19 NOTE — DISCHARGE INSTRUCTIONS
Same Day Surgery Discharge Instructions for  Sedation and General Anesthesia     It's not unusual to feel dizzy, light-headed or faint for up to 24 hours after surgery or while taking pain medication.  If you have these symptoms: sit for a few minutes before standing and have someone assist you when you get up to walk or use the bathroom.    You should rest and relax for the next 24 hours. We recommend you make arrangements to have an adult stay with you for at least 24 hours after your discharge.  Avoid hazardous and strenuous activity.    DO NOT DRIVE any vehicle or operate mechanical equipment for 24 hours following the end of your surgery.  Even though you may feel normal, your reactions may be affected by the medication you have received.    Do not drink alcoholic beverages for 24 hours following surgery.     Slowly progress to your regular diet as you feel able. It's not unusual to feel nauseated and/or vomit after receiving anesthesia.  If you develop these symptoms, drink clear liquids (apple juice, ginger ale, broth, 7-up, etc. ) until you feel better.  If your nausea and vomiting persists for 24 hours, please notify your surgeon.      All narcotic pain medications, along with inactivity and anesthesia, can cause constipation. Drinking plenty of liquids and increasing fiber intake will help.    For any questions of a medical nature, call your surgeon.    Do not make important decisions for 24 hours.    If you had general anesthesia, you may have a sore throat for a couple of days related to the breathing tube used during surgery.  You may use Cepacol lozenges to help with this discomfort.  If it worsens or if you develop a fever, contact your surgeon.     If you feel your pain is not well managed with the pain medications prescribed by your surgeon, please contact your surgeon's office to let them know so they can address your concerns.      Discharge Instructions for Power Port Placement      General  Instructions:  You will be given a card with information about your port.  You should carry this with you in your wallet/billfold. It provides information to clinicians about your port.  You should also carry the card in case your port triggers any security devices.     Activity:  Limit your activity for the first few days after your port is placed    Incision Care:   Unless otherwise directed by your surgeon, the dressing may removed tomorrow.    If a topical skin adhesive was used to close incision, you may shower tomorrow. Do not use soaps, lotions, or ointments on the wound area. Do not scrub the wound. After bathing, pat the wound dry with a soft towel.  Do not scratch, rub, or pick at the strips or film. Do not place tape directly over the strips or film.  Do not apply liquids (such as peroxide), ointments, or creams to the wound while the strips or film are in place.    Call your surgeon if you have:   Redness, swelling or drainage from incision   A fever of 101 F or greater.    Nausea or vomiting.   Pain that is not controlled by medications and/or rest.   Questions or concerns.        **If you have questions or concerns about your procedure,  call Dr. Cummings at [phone]**

## 2023-11-21 ENCOUNTER — TRANSFERRED RECORDS (OUTPATIENT)
Dept: HEALTH INFORMATION MANAGEMENT | Facility: CLINIC | Age: 73
End: 2023-11-21

## 2023-12-12 ENCOUNTER — TRANSFERRED RECORDS (OUTPATIENT)
Dept: HEALTH INFORMATION MANAGEMENT | Facility: CLINIC | Age: 73
End: 2023-12-12
Payer: MEDICARE

## 2023-12-21 ENCOUNTER — HOSPITAL ENCOUNTER (EMERGENCY)
Facility: CLINIC | Age: 73
Discharge: HOME | End: 2023-12-21
Attending: EMERGENCY MEDICINE | Admitting: EMERGENCY MEDICINE
Payer: MEDICARE

## 2023-12-21 ENCOUNTER — APPOINTMENT (OUTPATIENT)
Dept: GENERAL RADIOLOGY | Facility: CLINIC | Age: 73
End: 2023-12-21
Attending: EMERGENCY MEDICINE
Payer: MEDICARE

## 2023-12-21 VITALS
OXYGEN SATURATION: 97 % | HEIGHT: 67 IN | SYSTOLIC BLOOD PRESSURE: 143 MMHG | DIASTOLIC BLOOD PRESSURE: 75 MMHG | WEIGHT: 122.8 LBS | TEMPERATURE: 100.2 F | RESPIRATION RATE: 18 BRPM | HEART RATE: 99 BPM | BODY MASS INDEX: 19.27 KG/M2

## 2023-12-21 DIAGNOSIS — J10.1 INFLUENZA A: ICD-10-CM

## 2023-12-21 LAB
ALBUMIN SERPL BCG-MCNC: 4.1 G/DL (ref 3.5–5.2)
ALP SERPL-CCNC: 116 U/L (ref 40–150)
ALT SERPL W P-5'-P-CCNC: 19 U/L (ref 0–50)
ANION GAP SERPL CALCULATED.3IONS-SCNC: 12 MMOL/L (ref 7–15)
AST SERPL W P-5'-P-CCNC: 22 U/L (ref 0–45)
ATRIAL RATE - MUSE: 100 BPM
BASOPHILS # BLD AUTO: 0 10E3/UL (ref 0–0.2)
BASOPHILS NFR BLD AUTO: 0 %
BILIRUB SERPL-MCNC: 0.4 MG/DL
BUN SERPL-MCNC: 13 MG/DL (ref 8–23)
CALCIUM SERPL-MCNC: 8.7 MG/DL (ref 8.8–10.2)
CHLORIDE SERPL-SCNC: 101 MMOL/L (ref 98–107)
CREAT SERPL-MCNC: 1.05 MG/DL (ref 0.51–0.95)
DEPRECATED HCO3 PLAS-SCNC: 23 MMOL/L (ref 22–29)
DIASTOLIC BLOOD PRESSURE - MUSE: NORMAL MMHG
EGFRCR SERPLBLD CKD-EPI 2021: 55 ML/MIN/1.73M2
EOSINOPHIL # BLD AUTO: 0 10E3/UL (ref 0–0.7)
EOSINOPHIL NFR BLD AUTO: 0 %
ERYTHROCYTE [DISTWIDTH] IN BLOOD BY AUTOMATED COUNT: 14.8 % (ref 10–15)
FLUAV RNA SPEC QL NAA+PROBE: POSITIVE
FLUBV RNA RESP QL NAA+PROBE: NEGATIVE
GLUCOSE SERPL-MCNC: 104 MG/DL (ref 70–99)
HCT VFR BLD AUTO: 26.6 % (ref 35–47)
HGB BLD-MCNC: 8.9 G/DL (ref 11.7–15.7)
IMM GRANULOCYTES # BLD: 0.1 10E3/UL
IMM GRANULOCYTES NFR BLD: 1 %
INTERPRETATION ECG - MUSE: NORMAL
LYMPHOCYTES # BLD AUTO: 0.4 10E3/UL (ref 0.8–5.3)
LYMPHOCYTES NFR BLD AUTO: 5 %
MCH RBC QN AUTO: 33.1 PG (ref 26.5–33)
MCHC RBC AUTO-ENTMCNC: 33.5 G/DL (ref 31.5–36.5)
MCV RBC AUTO: 99 FL (ref 78–100)
MONOCYTES # BLD AUTO: 0.8 10E3/UL (ref 0–1.3)
MONOCYTES NFR BLD AUTO: 10 %
NEUTROPHILS # BLD AUTO: 6.5 10E3/UL (ref 1.6–8.3)
NEUTROPHILS NFR BLD AUTO: 84 %
NRBC # BLD AUTO: 0 10E3/UL
NRBC BLD AUTO-RTO: 0 /100
P AXIS - MUSE: 94 DEGREES
PLATELET # BLD AUTO: 130 10E3/UL (ref 150–450)
POTASSIUM SERPL-SCNC: 4.4 MMOL/L (ref 3.4–5.3)
PR INTERVAL - MUSE: 156 MS
PROT SERPL-MCNC: 6.5 G/DL (ref 6.4–8.3)
QRS DURATION - MUSE: 74 MS
QT - MUSE: 324 MS
QTC - MUSE: 417 MS
R AXIS - MUSE: 38 DEGREES
RBC # BLD AUTO: 2.69 10E6/UL (ref 3.8–5.2)
RSV RNA SPEC NAA+PROBE: NEGATIVE
SARS-COV-2 RNA RESP QL NAA+PROBE: NEGATIVE
SODIUM SERPL-SCNC: 136 MMOL/L (ref 135–145)
SYSTOLIC BLOOD PRESSURE - MUSE: NORMAL MMHG
T AXIS - MUSE: 93 DEGREES
TROPONIN T SERPL HS-MCNC: 14 NG/L
VENTRICULAR RATE- MUSE: 100 BPM
WBC # BLD AUTO: 7.7 10E3/UL (ref 4–11)

## 2023-12-21 PROCEDURE — 85025 COMPLETE CBC W/AUTO DIFF WBC: CPT | Performed by: EMERGENCY MEDICINE

## 2023-12-21 PROCEDURE — 93005 ELECTROCARDIOGRAM TRACING: CPT

## 2023-12-21 PROCEDURE — 71046 X-RAY EXAM CHEST 2 VIEWS: CPT

## 2023-12-21 PROCEDURE — 36415 COLL VENOUS BLD VENIPUNCTURE: CPT | Performed by: EMERGENCY MEDICINE

## 2023-12-21 PROCEDURE — 250N000011 HC RX IP 250 OP 636: Performed by: EMERGENCY MEDICINE

## 2023-12-21 PROCEDURE — 99285 EMERGENCY DEPT VISIT HI MDM: CPT | Mod: 25

## 2023-12-21 PROCEDURE — 87637 SARSCOV2&INF A&B&RSV AMP PRB: CPT | Performed by: EMERGENCY MEDICINE

## 2023-12-21 PROCEDURE — 84484 ASSAY OF TROPONIN QUANT: CPT | Performed by: EMERGENCY MEDICINE

## 2023-12-21 PROCEDURE — 87040 BLOOD CULTURE FOR BACTERIA: CPT | Performed by: EMERGENCY MEDICINE

## 2023-12-21 PROCEDURE — 80053 COMPREHEN METABOLIC PANEL: CPT | Performed by: EMERGENCY MEDICINE

## 2023-12-21 RX ORDER — HEPARIN SODIUM (PORCINE) LOCK FLUSH IV SOLN 100 UNIT/ML 100 UNIT/ML
5-10 SOLUTION INTRAVENOUS
Status: DISCONTINUED | OUTPATIENT
Start: 2023-12-21 | End: 2023-12-21 | Stop reason: HOSPADM

## 2023-12-21 RX ORDER — OSELTAMIVIR PHOSPHATE 30 MG/1
30 CAPSULE ORAL 2 TIMES DAILY
Qty: 10 CAPSULE | Refills: 0 | Status: SHIPPED | OUTPATIENT
Start: 2023-12-21 | End: 2023-12-26

## 2023-12-21 RX ADMIN — HEPARIN SODIUM (PORCINE) LOCK FLUSH IV SOLN 100 UNIT/ML 5 ML: 100 SOLUTION at 17:20

## 2023-12-21 ASSESSMENT — ACTIVITIES OF DAILY LIVING (ADL): ADLS_ACUITY_SCORE: 35

## 2023-12-21 NOTE — ED PROVIDER NOTES
"  History     Chief Complaint:  Chest Pain     HPI   Erin Casas is a 73 year old female with history of primary adenocarcinoma of left lung who presents for evaluation of chest pain. The patient reports that she had her last dose of chemotherapy for lung cancer 10 days ago and has had worsening cough and fatigue. States that she had a dry cough that became productive last night. Adds that she has a temperature of 100.2 F at home and a sore throat. Notes that her cough causes her to dry heave. Endorses loose stools. Reports that she has not been eating or drinking much but has been forcing herself to eat. Denies bloody stool, chest pain, shortness of breath, abdominal pain, dysuria, and hematuria.      Independent Historian:   None - Patient Only    Review of External Notes:   NA    Medications:    Alendronate  Cetirizine  Zolpidem   Hydromorphone  Senna    Past Medical History:    Primary adenocarcinoma of lung, left  Depression  Mononeuritis  Osteoporosis  Sigmoid diverticulitis   Recurrent UTI  Drug induced hepatitis  Pancreatitis     Past Surgical History:    Left thoracotomy, lower lobectomy, mediastinal lymph node dissection   Insert port     Physical Exam   Patient Vitals for the past 24 hrs:   BP Temp Temp src Pulse Resp SpO2 Height Weight   12/21/23 1520 137/66 100.2  F (37.9  C) Temporal 97 18 96 % 1.702 m (5' 7\") 55.7 kg (122 lb 12.8 oz)      Physical Exam  General: Resting on the bed.  Head: No obvious trauma to head.  Ears, Nose, Throat:  External ears normal.  Nose normal.  No pharyngeal erythema, swelling or exudate.  Midline uvula. Moist mucus membranes.  Eyes:  Conjunctivae clear.   Neck: Normal range of motion.  Neck supple.   CV: Regular rate and rhythm.  No murmurs.      Respiratory: Effort normal and breath sounds normal.  No wheezing or crackles.   Gastrointestinal: Soft.  No distension. There is no tenderness.  There is no rigidity, no rebound and no guarding.   Musculoskeletal: Normal " range of motion.  Non tender extremities to palpations.    Neuro: Alert. Moving all extremities appropriately.  Normal speech.    Skin: Skin is warm and dry.  No rash noted.   Psych: Normal mood and affect. Behavior is normal.     Emergency Department Course   ECG  ECG taken at 1510, ECG read at 1515  Sinus rhythm  Cannot rule out anterior infarct, age undetermined    Rate 100 bpm. MS interval 156 ms. QRS duration 74 ms. QT/QTc 324/417 ms. P-R-T axes 94 38 93.     Imaging:  Chest XR,  PA & LAT   Final Result   IMPRESSION: Left Port-A-Cath, with tip in the low SVC. Aortic   calcification. Heart size and pulmonary vascularity are within normal   limits. Lungs clear. No pleural effusions.      ADALBERTO ANNA MD            SYSTEM ID:  [id number]           Laboratory:  Labs Ordered and Resulted from Time of ED Arrival to Time of ED Departure   INFLUENZA A/B, RSV, & SARS-COV2 PCR - Abnormal       Result Value    Influenza A PCR Positive (*)     Influenza B PCR Negative      RSV PCR Negative      SARS CoV2 PCR Negative     COMPREHENSIVE METABOLIC PANEL - Abnormal    Sodium 136      Potassium 4.4      Carbon Dioxide (CO2) 23      Anion Gap 12      Urea Nitrogen 13.0      Creatinine 1.05 (*)     GFR Estimate 55 (*)     Calcium 8.7 (*)     Chloride 101      Glucose 104 (*)     Alkaline Phosphatase 116      AST 22      ALT 19      Protein Total 6.5      Albumin 4.1      Bilirubin Total 0.4     CBC WITH PLATELETS AND DIFFERENTIAL - Abnormal    WBC Count 7.7      RBC Count 2.69 (*)     Hemoglobin 8.9 (*)     Hematocrit 26.6 (*)     MCV 99      MCH 33.1 (*)     MCHC 33.5      RDW 14.8      Platelet Count 130 (*)     % Neutrophils 84      % Lymphocytes 5      % Monocytes 10      % Eosinophils 0      % Basophils 0      % Immature Granulocytes 1      NRBCs per 100 WBC 0      Absolute Neutrophils 6.5      Absolute Lymphocytes 0.4 (*)     Absolute Monocytes 0.8      Absolute Eosinophils 0.0      Absolute Basophils 0.0      Absolute  Immature Granulocytes 0.1      Absolute NRBCs 0.0     TROPONIN T, HIGH SENSITIVITY - Normal    Troponin T, High Sensitivity 14     BLOOD CULTURE   BLOOD CULTURE      Emergency Department Course & Assessments:     Interventions:  Medications   sodium chloride (PF) 0.9% PF flush 10-20 mL (has no administration in time range)   sodium chloride (PF) 0.9% PF flush 10-20 mL (has no administration in time range)   sodium chloride (PF) 0.9% PF flush 10-20 mL (has no administration in time range)   heparin 100 unit/mL injection 5-10 mL (has no administration in time range)      Assessments:  1521 I obtained a brief history and performed physical exam in triage.   1620 I rechecked and updated the patient.   1649 I rechecked and updated the patient.   1711 I rechecked and updated the patient.     Independent Interpretation (X-rays, CTs, rhythm strip):  None     Consultations/Discussion of Management or Tests:  None        Social Determinants of Health affecting care:   None    Disposition:  The patient was discharged to home.     Impression & Plan    Medical Decision Making:  Patient presents with chest pain and cough.  She does not appear toxic.  She is hemodynamically stable and afebrile.  EKG shows no ischemic changes.  Troponin is not elevated.  No leukocytosis.  2 blood cultures are obtained, from a peripheral IV, and the other from her report.  Chest x-ray shows no evidence of consolidation.  She is positive for influenza A.  It is likely that her symptoms are secondary to influenza.  She is oxygenating appropriately on room air.  She reports she feels comfortable discharging home.  Blood cultures are currently in process, and she will be contacted if they return positive.  She is given a prescription for Tamiflu.  Return precautions are given and she verbalizes understanding.  She is discharged home in stable condition with her daughter.      Diagnosis:    ICD-10-CM    1. Influenza A  J10.1            Discharge  Medications:  New Prescriptions    OSELTAMIVIR (TAMIFLU) 30 MG CAPSULE    Take 1 capsule (30 mg) by mouth 2 times daily for 5 days      Scribe Disclosure:  I, Rachelle Frank, am serving as a scribe at 4:19 PM on 12/21/2023 to document services personally performed by Suman Antunez MD based on my observations and the provider's statements to me.     12/21/2023   Suman Antunez MD Peery, Stephen, MD  12/21/23 2542

## 2023-12-21 NOTE — ED TRIAGE NOTES
Cough, fever 101.4 started 2 days ago.  On chemo tx - last tx on Tuesday.  Had nuelasta.   Oncology wants port cultured.      Triage Assessment (Adult)       Row Name 12/21/23 1518 12/21/23 1517       Triage Assessment    Airway WDL WDL WDL       Respiratory WDL    Respiratory WDL X;cough --       Cardiac WDL    Cardiac WDL WDL --       Peripheral/Neurovascular WDL    Peripheral Neurovascular WDL WDL --       Cognitive/Neuro/Behavioral WDL    Cognitive/Neuro/Behavioral WDL WDL --

## 2023-12-21 NOTE — DISCHARGE INSTRUCTIONS
You have influenza A.  We are giving you a prescription for Tamiflu, which may to get you feeling better slightly sooner than without taking Tamiflu.  Be sure that you are getting lots of rest, stay hydrated, and take Tylenol for fever.  Of note, your liver enzymes were within normal limits today.  This is good news.  Please return the emergency department if you develop any new or concerning symptoms.

## 2023-12-21 NOTE — ED NOTES
PIT/Triage Evaluation    Patient presented with cough and fatigue. The patient reports that she had her last dose of chemotherapy for lung cancer 10 days ago and has had worsening cough and fatigue. States that she had a dry cough that became productive last night. Adds that she has a temperature of 100.2 F at home and a sore throat. Notes that her cough causes her to dry heave. Endorses loose stools. Reports that she has not been eating or drinking much but has been forcing herself to eat. Denies bloody stool, chest pain, shortness of breath, abdominal pain, dysuria, and hematuria.     Exam is notable for:  General: Resting on the bed.  Head: No obvious trauma to head.  Ears, Nose, Throat:  External ears normal.  Nose normal.  No pharyngeal erythema, swelling or exudate.  Midline uvula. Moist mucus membranes.  Eyes:  Conjunctivae clear.   Neck: Normal range of motion.  Neck supple.   CV: Regular rate and rhythm.  No murmurs.      Respiratory: Effort normal and breath sounds normal.  No wheezing or crackles.   Gastrointestinal: Soft.  No distension. There is no tenderness.  There is no rigidity, no rebound and no guarding.   Musculoskeletal: Normal range of motion.  Non tender extremities to palpations.    Neuro: Alert. Moving all extremities appropriately.  Normal speech.    Skin: Skin is warm and dry.  No rash noted.   Psych: Normal mood and affect. Behavior is normal.       Appropriate interventions for symptom management were initiated if applicable.  Appropriate diagnostic tests were initiated if indicated.    Important information for subsequent clinician:    I briefly evaluated the patient and developed an initial plan of care. I discussed this plan and explained that this brief interaction does not constitute a full evaluation. Patient/family understands that they should wait to be fully evaluated and discuss any test results with another clinician prior to leaving the hospital.    Suman Antunez MD on  12/21/2023 at 3:30 PM         Suman Antunez MD  12/21/23 [id number]

## 2023-12-23 ENCOUNTER — HOSPITAL ENCOUNTER (EMERGENCY)
Facility: CLINIC | Age: 73
Discharge: HOME | End: 2023-12-23
Attending: EMERGENCY MEDICINE | Admitting: EMERGENCY MEDICINE
Payer: MEDICARE

## 2023-12-23 VITALS
RESPIRATION RATE: 16 BRPM | HEIGHT: 67 IN | OXYGEN SATURATION: 98 % | DIASTOLIC BLOOD PRESSURE: 74 MMHG | HEART RATE: 69 BPM | WEIGHT: 122 LBS | TEMPERATURE: 99.2 F | SYSTOLIC BLOOD PRESSURE: 126 MMHG | BODY MASS INDEX: 19.15 KG/M2

## 2023-12-23 DIAGNOSIS — R55 SYNCOPE, UNSPECIFIED SYNCOPE TYPE: ICD-10-CM

## 2023-12-23 DIAGNOSIS — Z85.118 HISTORY OF LUNG CANCER: ICD-10-CM

## 2023-12-23 DIAGNOSIS — I95.1 ORTHOSTASIS: ICD-10-CM

## 2023-12-23 LAB
ALBUMIN SERPL BCG-MCNC: 3.1 G/DL (ref 3.5–5.2)
ALP SERPL-CCNC: 87 U/L (ref 40–150)
ALT SERPL W P-5'-P-CCNC: 14 U/L (ref 0–50)
ANION GAP SERPL CALCULATED.3IONS-SCNC: 12 MMOL/L (ref 7–15)
AST SERPL W P-5'-P-CCNC: 25 U/L (ref 0–45)
ATRIAL RATE - MUSE: 77 BPM
BASOPHILS # BLD AUTO: 0 10E3/UL (ref 0–0.2)
BASOPHILS NFR BLD AUTO: 0 %
BILIRUB SERPL-MCNC: 0.2 MG/DL
BUN SERPL-MCNC: 14.5 MG/DL (ref 8–23)
CALCIUM SERPL-MCNC: 7.6 MG/DL (ref 8.8–10.2)
CHLORIDE SERPL-SCNC: 99 MMOL/L (ref 98–107)
CREAT SERPL-MCNC: 0.95 MG/DL (ref 0.51–0.95)
DEPRECATED HCO3 PLAS-SCNC: 18 MMOL/L (ref 22–29)
DIASTOLIC BLOOD PRESSURE - MUSE: NORMAL MMHG
EGFRCR SERPLBLD CKD-EPI 2021: 62 ML/MIN/1.73M2
EOSINOPHIL # BLD AUTO: 0 10E3/UL (ref 0–0.7)
EOSINOPHIL NFR BLD AUTO: 0 %
ERYTHROCYTE [DISTWIDTH] IN BLOOD BY AUTOMATED COUNT: 15.4 % (ref 10–15)
GLUCOSE SERPL-MCNC: 131 MG/DL (ref 70–99)
HCT VFR BLD AUTO: 25.2 % (ref 35–47)
HGB BLD-MCNC: 8.3 G/DL (ref 11.7–15.7)
HOLD SPECIMEN: NORMAL
IMM GRANULOCYTES # BLD: 0.2 10E3/UL
IMM GRANULOCYTES NFR BLD: 2 %
INTERPRETATION ECG - MUSE: NORMAL
LYMPHOCYTES # BLD AUTO: 0.5 10E3/UL (ref 0.8–5.3)
LYMPHOCYTES NFR BLD AUTO: 5 %
MCH RBC QN AUTO: 33.1 PG (ref 26.5–33)
MCHC RBC AUTO-ENTMCNC: 32.9 G/DL (ref 31.5–36.5)
MCV RBC AUTO: 100 FL (ref 78–100)
MONOCYTES # BLD AUTO: 0.7 10E3/UL (ref 0–1.3)
MONOCYTES NFR BLD AUTO: 6 %
NEUTROPHILS # BLD AUTO: 10.1 10E3/UL (ref 1.6–8.3)
NEUTROPHILS NFR BLD AUTO: 87 %
NRBC # BLD AUTO: 0 10E3/UL
NRBC BLD AUTO-RTO: 0 /100
P AXIS - MUSE: 75 DEGREES
PLATELET # BLD AUTO: 102 10E3/UL (ref 150–450)
POTASSIUM SERPL-SCNC: 3.7 MMOL/L (ref 3.4–5.3)
PR INTERVAL - MUSE: 182 MS
PROT SERPL-MCNC: 5.5 G/DL (ref 6.4–8.3)
QRS DURATION - MUSE: 86 MS
QT - MUSE: 392 MS
QTC - MUSE: 443 MS
R AXIS - MUSE: 68 DEGREES
RBC # BLD AUTO: 2.51 10E6/UL (ref 3.8–5.2)
SODIUM SERPL-SCNC: 129 MMOL/L (ref 135–145)
SYSTOLIC BLOOD PRESSURE - MUSE: NORMAL MMHG
T AXIS - MUSE: -37 DEGREES
VENTRICULAR RATE- MUSE: 77 BPM
WBC # BLD AUTO: 11.6 10E3/UL (ref 4–11)

## 2023-12-23 PROCEDURE — 36415 COLL VENOUS BLD VENIPUNCTURE: CPT | Performed by: EMERGENCY MEDICINE

## 2023-12-23 PROCEDURE — 85025 COMPLETE CBC W/AUTO DIFF WBC: CPT | Performed by: EMERGENCY MEDICINE

## 2023-12-23 PROCEDURE — 99284 EMERGENCY DEPT VISIT MOD MDM: CPT | Mod: 25

## 2023-12-23 PROCEDURE — 96360 HYDRATION IV INFUSION INIT: CPT

## 2023-12-23 PROCEDURE — 258N000003 HC RX IP 258 OP 636: Performed by: EMERGENCY MEDICINE

## 2023-12-23 PROCEDURE — 96361 HYDRATE IV INFUSION ADD-ON: CPT

## 2023-12-23 PROCEDURE — 80053 COMPREHEN METABOLIC PANEL: CPT | Performed by: EMERGENCY MEDICINE

## 2023-12-23 PROCEDURE — 93005 ELECTROCARDIOGRAM TRACING: CPT

## 2023-12-23 RX ORDER — SODIUM CHLORIDE 9 MG/ML
INJECTION, SOLUTION INTRAVENOUS ONCE
Status: COMPLETED | OUTPATIENT
Start: 2023-12-23 | End: 2023-12-23

## 2023-12-23 RX ADMIN — SODIUM CHLORIDE: 9 INJECTION, SOLUTION INTRAVENOUS at 15:34

## 2023-12-23 ASSESSMENT — ACTIVITIES OF DAILY LIVING (ADL)
ADLS_ACUITY_SCORE: 35

## 2023-12-23 NOTE — ED PROVIDER NOTES
"  History     Chief Complaint:  Syncope       The history is provided by the patient and a relative (Daughter).      Erin Casas is a 73 year old female with history of lung cancer, osteoporosis who presents to the ED with syncope and weakness. Daughter reports asking the patient to unlock the door at home at around 1400 today. Daughter states patient was able to unlock the door, but she then staggered and passed out. Daughter was able to catch the patient and lay her on the floor. When she laid the patient flat, patient gurgled and had some feet movement. She then sat her up a little and dialed for EMS. Patient had passed out for around 10-20 seconds. She was fully alert when she awoken but had some trouble speaking for some amount of seconds. Additional symptoms recently include weakness. Weakness exacerbated with most recent chemo on 12/11 and diagnosed with influenza A on 12/21. Patient states that the cancer is isolated to lungs. She has received 5 treatments of radiation. Cancer is caused from smoking. Denies dysuria.    Independent Historian:   Daughter - They report as noted above.    Review of External Notes:   NONE     Medications:    Zyrtec  Macrodantin  Tamiflu  Ambien  Probiotic PO    Past Medical History:    Abdominal bloating  Adenocarcinoma of left lung  Depression   Midline thoracic back pain  Mononeuritis  Osteoporosis  Sigmoid diverticulosis   Recurrent urinary tract infection  Drug induced hepatitis  Pancreatitis  Former smoker    Past Surgical History:    Thoracotomy, left  Lobectomy lung, left  Hernia repair  Insert port vascular access, left     Physical Exam   Patient Vitals for the past 24 hrs:   BP Temp Temp src Pulse Resp SpO2 Height Weight   12/23/23 1845 126/74 -- -- -- -- 98 % -- --   12/23/23 1830 116/62 -- -- -- -- 98 % -- --   12/23/23 1800 119/65 -- -- 69 -- 97 % -- --   12/23/23 1600 112/63 -- -- 70 -- 97 % -- --   12/23/23 1549 -- -- -- -- -- -- 1.702 m (5' 7\") 55.3 kg (122 " lb)   12/23/23 1449 120/71 99.2  F (37.3  C) Oral 68 16 99 % -- --     Physical Exam  Constitutional: Middle age, white female, supine, no respiratory distress. Thin  HENT: No signs of trauma.   Eyes: EOM are normal. Pupils are equal, round, and reactive to light.   Neck: Normal range of motion. No JVD present. No cervical adenopathy.  Cardiovascular: Regular rhythm.  Exam reveals no gallop and no friction rub.    No murmur heard.  Pulmonary/Chest: Bilateral breath sounds normal. No wheezes, rhonchi or rales.  Abdominal: Soft. No tenderness. No rebound or guarding.   Musculoskeletal: No edema. No tenderness.   Lymphadenopathy: No lymphadenopathy.   Neurological: Alert and oriented to person, place, and time. Normal strength. Coordination normal.   Skin: Skin is warm and dry. No rash noted. No erythema.       Emergency Department Course   ECG  ECG taken at 1527  Sinus rhythm with premature atrial complexes  Nonspecific T wave abnormality   Rate 77 bpm. AR interval 182 ms. QRS duration 86 ms. QT/QTc 392/443 ms. P-R-T axes 75 68 -37.     Laboratory:  Labs Ordered and Resulted from Time of ED Arrival to Time of ED Departure   COMPREHENSIVE METABOLIC PANEL - Abnormal       Result Value    Sodium 129 (*)     Potassium 3.7      Carbon Dioxide (CO2) 18 (*)     Anion Gap 12      Urea Nitrogen 14.5      Creatinine 0.95      GFR Estimate 62      Calcium 7.6 (*)     Chloride 99      Glucose 131 (*)     Alkaline Phosphatase 87      AST 25      ALT 14      Protein Total 5.5 (*)     Albumin 3.1 (*)     Bilirubin Total 0.2     CBC WITH PLATELETS AND DIFFERENTIAL - Abnormal    WBC Count 11.6 (*)     RBC Count 2.51 (*)     Hemoglobin 8.3 (*)     Hematocrit 25.2 (*)           MCH 33.1 (*)     MCHC 32.9      RDW 15.4 (*)     Platelet Count 102 (*)     % Neutrophils 87      % Lymphocytes 5      % Monocytes 6      % Eosinophils 0      % Basophils 0      % Immature Granulocytes 2      NRBCs per 100 WBC 0      Absolute Neutrophils  10.1 (*)     Absolute Lymphocytes 0.5 (*)     Absolute Monocytes 0.7      Absolute Eosinophils 0.0      Absolute Basophils 0.0      Absolute Immature Granulocytes 0.2      Absolute NRBCs 0.0          Emergency Department Course & Assessments:  Interventions:  Medications   sodium chloride 0.9 % infusion (0 mLs Intravenous Stopped 12/23/23 1856)        Assessments:  1543 I obtained the history and examined the patient as noted above.  1855 I rechecked and updated the patient. Patient was comfortable with discharge plan.    Independent Interpretation (X-rays, CTs, rhythm strip):  None    Consultations/Discussion of Management or Tests:  None        Social Determinants of Health affecting care:   None    Disposition:  The patient was discharged to home.     Impression & Plan    Medical Decision Making:  Patient is a 73 year old female who is home unfortunately suffering from influenza, she is tamiflu. She went to open the door for her daughter. As her daughter went in, she looked to her mom, her moms eyes started to rollback, and she started to go down. She was caught by her daughter and laid down on the ground. She had a little shaking that lasted 10-15 seconds and then she recovered with no confusion. She has been eating poorly and not drinking lot, being bedridden. She is able to urinate and has no urinary symptoms, no vomiting, no diarrhea. Patient has no history of seizure and no of cardiac arrhythmias. She was very orthostatic here on our numbers. Patient had labs checked and ECG checked. She had IV fluids given. She did have some PACs which is likely what the paramedics thought was atrial fibrillation but was most likely not. After a liter of fluid and drinking fluid, she was able to ambulate. She felt much better. Her orthostatics were improved. I think this is all consistent with orthostatic hypotension causing a brief syncope. I do not think this is arrhythmia or a seizure. Patient will increase her fluids.  Instead of using Gatorade zero, you will use regular Gatorade with calories and she will follow up with primary care. If symptoms recur or worsen, recheck in ED.    Diagnosis:    ICD-10-CM    1. Syncope, unspecified syncope type  R55       2. Orthostasis  I95.1       3. History of lung cancer  Z85.118            Discharge Medications:  Discharge Medication List as of 12/23/2023  6:56 PM         Scribe Disclosure:  I, Moreno Estrada, am serving as a scribe at 4:40 PM on 12/23/2023 to document services personally performed by Hamzah Villasenor MD based on my observations and the provider's statements to me.     12/23/2023   Hamzah Villasenor MD Steinman, Randall Ira, MD  12/24/23 0020

## 2023-12-23 NOTE — ED NOTES
Bed: ED26  Expected date:   Expected time:   Means of arrival:   Comments:  Jessenia - 73 F syncope a fib +flu eta 2700

## 2023-12-26 LAB
BACTERIA BLD CULT: NO GROWTH
BACTERIA BLD CULT: NO GROWTH

## 2023-12-28 ENCOUNTER — TRANSFERRED RECORDS (OUTPATIENT)
Dept: HEALTH INFORMATION MANAGEMENT | Facility: CLINIC | Age: 73
End: 2023-12-28
Payer: MEDICARE

## 2023-12-29 ENCOUNTER — MEDICAL CORRESPONDENCE (OUTPATIENT)
Dept: HEALTH INFORMATION MANAGEMENT | Facility: CLINIC | Age: 73
End: 2023-12-29
Payer: MEDICARE

## 2023-12-29 ENCOUNTER — TELEPHONE (OUTPATIENT)
Dept: CARDIOLOGY | Facility: CLINIC | Age: 73
End: 2023-12-29
Payer: MEDICARE

## 2023-12-29 DIAGNOSIS — C34.32 PRIMARY MALIGNANT NEOPLASM OF BRONCHUS OF LEFT LOWER LOBE (H): Primary | ICD-10-CM

## 2023-12-29 DIAGNOSIS — Z01.818 EXAMINATION PRIOR TO CHEMOTHERAPY: ICD-10-CM

## 2023-12-29 DIAGNOSIS — I48.91 ATRIAL FIBRILLATION WITH RAPID VENTRICULAR RESPONSE (H): ICD-10-CM

## 2023-12-29 DIAGNOSIS — Z51.81 THERAPEUTIC DRUG MONITORING: ICD-10-CM

## 2023-12-29 DIAGNOSIS — Z01.818 EXAMINATION PRIOR TO CHEMOTHERAPY: Primary | ICD-10-CM

## 2023-12-29 NOTE — TELEPHONE ENCOUNTER
[facility] Call Center    Phone Message    May a detailed message be left on voicemail: no     Reason for Call: Appointment Intake    Referring Provider Name:     Marlon Masters MD in  [facility]     Diagnosis and/or Symptoms:     Primary malignant neoplasm of bronchus of left lower lobe (H) [C34.32]  Examination prior to chemotherapy [Z01.818]  Atrial fibrillation with rapid ventricular response (H) [I48.91]       ASAP to see Dr. David, per urgent referral.  Please review and call pt to schedule.  Declined Feb appt.     Action Taken: Other: cardio    Travel Screening: Not Applicable

## 2024-01-02 ENCOUNTER — HOSPITAL ENCOUNTER (OUTPATIENT)
Dept: CARDIOLOGY | Facility: CLINIC | Age: 74
Discharge: HOME | End: 2024-01-02
Attending: INTERNAL MEDICINE | Admitting: INTERNAL MEDICINE
Payer: MEDICARE

## 2024-01-02 DIAGNOSIS — Z51.81 THERAPEUTIC DRUG MONITORING: ICD-10-CM

## 2024-01-02 DIAGNOSIS — Z01.818 EXAMINATION PRIOR TO CHEMOTHERAPY: ICD-10-CM

## 2024-01-02 LAB — LVEF ECHO: NORMAL

## 2024-01-02 PROCEDURE — 93306 TTE W/DOPPLER COMPLETE: CPT

## 2024-01-02 PROCEDURE — 93356 MYOCRD STRAIN IMG SPCKL TRCK: CPT | Performed by: INTERNAL MEDICINE

## 2024-01-02 PROCEDURE — 93306 TTE W/DOPPLER COMPLETE: CPT | Mod: 26 | Performed by: INTERNAL MEDICINE

## 2024-01-05 ENCOUNTER — OFFICE VISIT (OUTPATIENT)
Dept: CARDIOLOGY | Facility: CLINIC | Age: 74
End: 2024-01-05
Payer: MEDICARE

## 2024-01-05 VITALS
SYSTOLIC BLOOD PRESSURE: 94 MMHG | DIASTOLIC BLOOD PRESSURE: 59 MMHG | HEART RATE: 96 BPM | BODY MASS INDEX: 19.42 KG/M2 | WEIGHT: 123.7 LBS | HEIGHT: 67 IN

## 2024-01-05 DIAGNOSIS — I48.0 PAROXYSMAL ATRIAL FIBRILLATION (H): ICD-10-CM

## 2024-01-05 DIAGNOSIS — Z01.818 EXAMINATION PRIOR TO CHEMOTHERAPY: ICD-10-CM

## 2024-01-05 DIAGNOSIS — C34.32 PRIMARY MALIGNANT NEOPLASM OF BRONCHUS OF LEFT LOWER LOBE (H): ICD-10-CM

## 2024-01-05 PROCEDURE — 99204 OFFICE O/P NEW MOD 45 MIN: CPT | Performed by: INTERNAL MEDICINE

## 2024-01-05 RX ORDER — LIDOCAINE 50 MG/G
PATCH TOPICAL EVERY 24 HOURS
COMMUNITY

## 2024-01-05 NOTE — PROGRESS NOTES
HPI and Plan:     I the pleasure of seeing An Solorzano in cardio oncology clinic on request of oncology team.  Patient has been referred by Dr. Masters in [place] onc[facility].    Patient was diagnosed with adenocarcinoma of the left lower lobe and left upper lobe of the lung in July 2023.  This was biopsy-proven as well as a mediastinal lymph node dissection was done.  Subsequently she underwent left lower lobectomy.  In addition, patient underwent definitive radiation therapy for the left upper lobe cancer.  Now she is receiving neoadjuvant chemotherapy with cisplatin and pemetrexed.    She finished her chemotherapy in December 11.  On December 21 she was diagnosed with influenza A.  She was visiting her daughter in [place].  She developed progressive shortness of breath and cough and was admitted at [facility] in [place].  She was treated for pneumonia and CT chest showed bilateral infiltrates.  During the admission, she had transient episode of atrial fibrillation and EKG was done to confirm that.  Because blood pressures are short, she was given digoxin.  I reviewed that in the discharge summary notes.  Unfortunately, no EKG was scanned in Care Everywhere for me to confirm atrial fibrillation.  Nevertheless, it was a short lasting episode and by morning time, she had converted to sinus rhythm.    She subsequently an echocardiogram which I reviewed with her.  It revealed normal ejection fraction with no regional wall motion abnormalities or valvular disease.  EKG done today revealed sinus rhythm with PACs and nonspecific T wave inversions.    She was feeling well last few days but this morning she is again weak.  Her blood pressure is somewhat soft.  She denies any fever or chills.    Recent labs reviewed.  Her hemoglobin recent was 8.  Platelet count was 579.  WBC count was normal.  BMP revealed normal potassium and creatinine.  Sodium was 134.    On exam, regular rate and rhythm.  No murmurs.  Chest  was clear to auscultation.  No carotid bruits.  Decreased air entry at the bases on the left side.    Patient was a smoker 23 years ago and smoked less than a pack a day.  Does not drink alcohol.  No family history of premature CAD.    Impression    Short lasting episode of atrial fibrillation noted in [facility] when she was admitted with influenza A and bilateral pneumonia, atrial fibrillation likely related to acute illness and converted spontaneously sinus rhythm  Left lower lobe and upper lobe adenocarcinoma, s/p radiation for the upper lobe and adjuvant chemotherapy with cisplatin and pemetrexed preceded by left lower lobectomy.  Anemia, likely related to chemotherapy    Discussion  Patient was referred here for atrial fibrillation.  She had transient episode of atrial fibrillation likely related to acute illness of influenza and pneumonia.  With resolution of this acute illness, the atrial fibrillation converted back to sinus rhythm.  Given this possibility that atrial fibrillation was related to acute illness, I would hold off on long-term anticoagulation at this time and this is a preference both by the patient and her daughter who is accompanying her.  She does have a CHADS2 score of 3 given her age and female sex.  She is wearing a Zio patch monitor and will also review for any recurrence of atrial fibrillation on it.  If there is recurrence of atrial fibrillation, then we may have to revisit the use of anticoagulation long-term.    Her weakness today is likely related to somewhat of low blood pressures.  I asked her to increase hydration and use some Gatorade.  We also asked her to liberalize salt intake which will help her blood pressure.    Once the Zio patch is completed, the patient will call me so that we can obtain the results from [facility] system and review it.      Thank you for allowing us to personally care of this nice patient.    Sincerely,    David Nguyen MD      Today's clinic  visit entailed:  Review of external notes as documented elsewhere in note  Review of the result(s) of each unique test - bmp, cbc, EKG, echoekg, echo  The following tests were independently interpreted by me as noted in my documentation: EKG, echo  Prescription drug management    Provider  Link to MDM Help Grid     The level of medical decision making during this visit was of moderate complexity.      No orders of the defined types were placed in this encounter.      Orders Placed This Encounter   Medications    lidocaine (LIDODERM) 5 % patch     Sig: Place onto the skin every 24 hours To prevent lidocaine toxicity, patient should be patch free for 12 hrs daily. PRN    Dextromethorphan-guaiFENesin (MUCINEX DM PO)     Sig: Take by mouth daily    Acetaminophen (TYLENOL) 325 MG CAPS     Sig: Take 325-650 mg by mouth every 4 hours as needed       There are no discontinued medications.      Encounter Diagnoses   Name Primary?    Primary malignant neoplasm of bronchus of left lower lobe (H)     Examination prior to chemotherapy     Atrial fibrillation with rapid ventricular response (H)        CURRENT MEDICATIONS:  Current Outpatient Medications   Medication Sig Dispense Refill    Acetaminophen (TYLENOL) 325 MG CAPS Take 325-650 mg by mouth every 4 hours as needed      alendronate (FOSAMAX) 70 MG tablet Take 1 tablet by mouth every 7 days Sundays      cetirizine (ZYRTEC) 10 MG tablet Take 1 tablet by mouth as needed for allergies      cycloSPORINE (RESTASIS) 0.05 % ophthalmic emulsion Place 1 drop into both eyes 2 times daily      Dextromethorphan-guaiFENesin (MUCINEX DM PO) Take by mouth daily      Fiber Select Gummies CHEW Take 2 chew tab by mouth every morning      lidocaine (LIDODERM) 5 % patch Place onto the skin every 24 hours To prevent lidocaine toxicity, patient should be patch free for 12 hrs daily. PRN      Probiotic Product (PROBIOTIC PO) Take 1 tablet by mouth every morning      zolpidem (AMBIEN) 5 MG tablet  Take 1 tablet by mouth nightly as needed for sleep (only for traveling)      nitroFURantoin macrocrystal (MACRODANTIN) 50 MG capsule Take 1 capsule by mouth every morning (Patient not taking: Reported on 2024)         ALLERGIES   No Known Allergies    PAST MEDICAL HISTORY:  Past Medical History:   Diagnosis Date    Abdominal bloating     Adenocarcinoma of left lung (H)     Depression     Midline thoracic back pain     Mononeuritis     Osteoporosis     Sigmoid diverticulosis        PAST SURGICAL HISTORY:  Past Surgical History:   Procedure Laterality Date    HERNIA REPAIR      INSERT PORT VASCULAR ACCESS Left 2023    Procedure: PORT PLACEMENT;  Surgeon: Baron Cummings MD;  Location:  OR    LOBECTOMY LUNG Left 2023    Procedure: Left Lower Lobectomy;  Surgeon: Baron Cummings MD;  Location:  OR    pr anesthesia of shoulder for impingement, bilateral      THORACOTOMY Left 2023    Procedure: LEFT THORACOTOMY WITH MEDIASINAL LYMPH NODE DISSECTION;  Surgeon: Baron Cummings MD;  Location:  OR       FAMILY HISTORY:  No family history on file.    SOCIAL HISTORY:  Social History     Socioeconomic History    Marital status:      Spouse name: None    Number of children: None    Years of education: None    Highest education level: None   Tobacco Use    Smoking status: Former     Packs/day: 0.50     Years: 10.00     Additional pack years: 0.00     Total pack years: 5.00     Types: Cigarettes     Start date:      Quit date:      Years since quittin.0    Smokeless tobacco: Never   Substance and Sexual Activity    Alcohol use: Not Currently       Review of Systems:  Skin:          Eyes:         ENT:         Respiratory:          Cardiovascular:         Gastroenterology:        Genitourinary:         Musculoskeletal:         Neurologic:         Psychiatric:         Heme/Lymph/Imm:         Endocrine:           Physical Exam:  Vitals: BP 94/59   Pulse 96   Ht  "1.702 m (5' 7\")   Wt 56.1 kg (123 lb 11.2 oz)   BMI 19.37 kg/m      Constitutional:           Skin:             Head:           Eyes:           Lymph:      ENT:           Neck:           Respiratory:            Cardiac:                                                           GI:           Extremities and Muscular Skeletal:                 Neurological:           Psych:           CC  [facility]  [address]  [place] [address]                "

## 2024-01-05 NOTE — TELEPHONE ENCOUNTER
01/05/24 Telemetry tracings and EKGs ( x2) dated 12/28 recd from [facility] . Copies to HIM for scanning , copy for Dr Lamas for [facility] 5976 am

## 2024-01-05 NOTE — LETTER
1/5/2024    TRACEY WADE MD  [address]  [facility] [address]    RE: Erin ISRA Casas       Dear Colleague,     I had the pleasure of seeing Erinblaze Casas in the [facility].  HPI and Plan:     I the pleasure of seeing An Solorzano in cardio oncology clinic on request of oncology team.  Patient has been referred by Dr. Masters in [place] [facility].    Patient was diagnosed with adenocarcinoma of the left lower lobe and left upper lobe of the lung in July 2023.  This was biopsy-proven as well as a mediastinal lymph node dissection was done.  Subsequently she underwent left lower lobectomy.  In addition, patient underwent definitive radiation therapy for the left upper lobe cancer.  Now she is receiving neoadjuvant chemotherapy with cisplatin and pemetrexed.    She finished her chemotherapy in December 11.  On December 21 she was diagnosed with influenza A.  She was visiting her daughter in [place].  She developed progressive shortness of breath and cough and was admitted at [facility] in [place].  She was treated for pneumonia and CT chest showed bilateral infiltrates.  During the admission, she had transient episode of atrial fibrillation and EKG was done to confirm that.  Because blood pressures are short, she was given digoxin.  I reviewed that in the discharge summary notes.  Unfortunately, no EKG was scanned in Care Everywhere for me to confirm atrial fibrillation.  Nevertheless, it was a short lasting episode and by morning time, she had converted to sinus rhythm.    She subsequently an echocardiogram which I reviewed with her.  It revealed normal ejection fraction with no regional wall motion abnormalities or valvular disease.  EKG done today revealed sinus rhythm with PACs and nonspecific T wave inversions.    She was feeling well last few days but this morning she is again weak.  Her blood pressure is somewhat soft.  She denies any fever or chills.    Recent labs  reviewed.  Her hemoglobin recent was 8.  Platelet count was 579.  WBC count was normal.  BMP revealed normal potassium and creatinine.  Sodium was 134.    On exam, regular rate and rhythm.  No murmurs.  Chest was clear to auscultation.  No carotid bruits.  Decreased air entry at the bases on the left side.    Patient was a smoker 23 years ago and smoked less than a pack a day.  Does not drink alcohol.  No family history of premature CAD.    Impression    Short lasting episode of atrial fibrillation noted in [facility] when she was admitted with influenza A and bilateral pneumonia, atrial fibrillation likely related to acute illness and converted spontaneously sinus rhythm  Left lower lobe and upper lobe adenocarcinoma, s/p radiation for the upper lobe and adjuvant chemotherapy with cisplatin and pemetrexed preceded by left lower lobectomy.  Anemia, likely related to chemotherapy    Discussion  Patient was referred here for atrial fibrillation.  She had transient episode of atrial fibrillation likely related to acute illness of influenza and pneumonia.  With resolution of this acute illness, the atrial fibrillation converted back to sinus rhythm.  Given this possibility that atrial fibrillation was related to acute illness, I would hold off on long-term anticoagulation at this time and this is a preference both by the patient and her daughter who is accompanying her.  She does have a CHADS2 score of 3 given her age and female sex.  She is wearing a Zio patch monitor and will also review for any recurrence of atrial fibrillation on it.  If there is recurrence of atrial fibrillation, then we may have to revisit the use of anticoagulation long-term.    Her weakness today is likely related to somewhat of low blood pressures.  I asked her to increase hydration and use some Gatorade.  We also asked her to liberalize salt intake which will help her blood pressure.    Once the Zio patch is completed, the patient  will call me so that we can obtain the results from [facility] system and review it.      Thank you for allowing us to personally care of this nice patient.    Sincerely,    David Nguyen MD      Today's clinic visit entailed:  Review of external notes as documented elsewhere in note  Review of the result(s) of each unique test - bmp, cbc, EKG, echoekg, echo  The following tests were independently interpreted by me as noted in my documentation: EKG, echo  Prescription drug management    Provider  Link to [facility] Help Grid     The level of medical decision making during this visit was of moderate complexity.      No orders of the defined types were placed in this encounter.      Orders Placed This Encounter   Medications    lidocaine (LIDODERM) 5 % patch     Sig: Place onto the skin every 24 hours To prevent lidocaine toxicity, patient should be patch free for 12 hrs daily. PRN    Dextromethorphan-guaiFENesin (MUCINEX DM PO)     Sig: Take by mouth daily    Acetaminophen (TYLENOL) 325 MG CAPS     Sig: Take 325-650 mg by mouth every 4 hours as needed       There are no discontinued medications.      Encounter Diagnoses   Name Primary?    Primary malignant neoplasm of bronchus of left lower lobe (H)     Examination prior to chemotherapy     Atrial fibrillation with rapid ventricular response (H)        CURRENT MEDICATIONS:  Current Outpatient Medications   Medication Sig Dispense Refill    Acetaminophen (TYLENOL) 325 MG CAPS Take 325-650 mg by mouth every 4 hours as needed      alendronate (FOSAMAX) 70 MG tablet Take 1 tablet by mouth every 7 days Sundays      cetirizine (ZYRTEC) 10 MG tablet Take 1 tablet by mouth as needed for allergies      cycloSPORINE (RESTASIS) 0.05 % ophthalmic emulsion Place 1 drop into both eyes 2 times daily      Dextromethorphan-guaiFENesin (MUCINEX DM PO) Take by mouth daily      Fiber Select Gummies CHEW Take 2 chew tab by mouth every morning      lidocaine (LIDODERM) 5 % patch Place onto the skin  every 24 hours To prevent lidocaine toxicity, patient should be patch free for 12 hrs daily. PRN      Probiotic Product (PROBIOTIC PO) Take 1 tablet by mouth every morning      zolpidem (AMBIEN) 5 MG tablet Take 1 tablet by mouth nightly as needed for sleep (only for traveling)      nitroFURantoin macrocrystal (MACRODANTIN) 50 MG capsule Take 1 capsule by mouth every morning (Patient not taking: Reported on 2024)         ALLERGIES   No Known Allergies    PAST MEDICAL HISTORY:  Past Medical History:   Diagnosis Date    Abdominal bloating     Adenocarcinoma of left lung (H)     Depression     Midline thoracic back pain     Mononeuritis     Osteoporosis     Sigmoid diverticulosis        PAST SURGICAL HISTORY:  Past Surgical History:   Procedure Laterality Date    HERNIA REPAIR      INSERT PORT VASCULAR ACCESS Left 2023    Procedure: PORT PLACEMENT;  Surgeon: Baron Cummings MD;  Location:  OR    LOBECTOMY LUNG Left 2023    Procedure: Left Lower Lobectomy;  Surgeon: Baron Cummings MD;  Location:  OR    pr anesthesia of shoulder for impingement, bilateral      THORACOTOMY Left 2023    Procedure: LEFT THORACOTOMY WITH MEDIASINAL LYMPH NODE DISSECTION;  Surgeon: Baron Cummings MD;  Location:  OR       FAMILY HISTORY:  No family history on file.    SOCIAL HISTORY:  Social History     Socioeconomic History    Marital status:      Spouse name: None    Number of children: None    Years of education: None    Highest education level: None   Tobacco Use    Smoking status: Former     Packs/day: 0.50     Years: 10.00     Additional pack years: 0.00     Total pack years: 5.00     Types: Cigarettes     Start date:      Quit date: 2000     Years since quittin.0    Smokeless tobacco: Never   Substance and Sexual Activity    Alcohol use: Not Currently       Review of Systems:  Skin:          Eyes:         ENT:         Respiratory:          Cardiovascular:        "  Gastroenterology:        Genitourinary:         Musculoskeletal:         Neurologic:         Psychiatric:         Heme/Lymph/Imm:         Endocrine:           Physical Exam:  Vitals: BP 94/59   Pulse 96   Ht 1.702 m (5' 7\")   Wt 56.1 kg (123 lb 11.2 oz)   BMI 19.37 kg/m      Constitutional:           Skin:             Head:           Eyes:           Lymph:      ENT:           Neck:           Respiratory:            Cardiac:                                                           GI:           Extremities and Muscular Skeletal:                 Neurological:           Psych:               CC  Marlon Masters MD  [facility]  [address] [facility] [address]  [place]  [place] [address]      Thank you for allowing me to participate in the care of your patient.      Sincerely,     David Nguyen MD     [facility]  "

## 2024-01-05 NOTE — TELEPHONE ENCOUNTER
"01/05/24 Called [facility] in [facility] to obtain EKGs and telemetry tracings from hospitalization 12/27 for \" possible Afib\".  Called pt who had hospital follow up 1/2 w PCP Dr Villalobos and 7 day ZP was placed. Explained to pt that appt will need to be moved out so monitor results will ne available. Pt stated she is trying to get to [place] as she is a permanent resident there. She is currently being treated for lung CA and had a PET scan a few days ago. She has a F/U with her Oncologist next week and is hoping to get the ok to travel  Appt moved out to 1/24 for now, pt will call back if needing to re-schedule  Message sent to scheduling to change.  Will await EKGs and Telemetry tracings from [facility] 110 am  "

## 2024-05-29 ENCOUNTER — TRANSCRIBE ORDERS (OUTPATIENT)
Dept: OTHER | Age: 74
End: 2024-05-29

## 2024-05-29 DIAGNOSIS — C34.90 NON-SMALL CELL CARCINOMA OF LUNG (H): Primary | ICD-10-CM

## 2024-05-29 DIAGNOSIS — C34.92 NON-SMALL CELL CARCINOMA OF LEFT LUNG (H): ICD-10-CM

## 2024-06-19 DIAGNOSIS — C34.90 NON-SMALL CELL LUNG CANCER, UNSPECIFIED LATERALITY (H): Primary | ICD-10-CM

## 2024-06-19 DIAGNOSIS — Z90.2 S/P LOBECTOMY OF LUNG: ICD-10-CM

## 2024-06-19 DIAGNOSIS — R06.02 SOB (SHORTNESS OF BREATH): ICD-10-CM

## 2024-06-21 DIAGNOSIS — C34.90 NON-SMALL CELL LUNG CANCER, UNSPECIFIED LATERALITY (H): Primary | ICD-10-CM

## 2024-06-21 DIAGNOSIS — R06.02 SOB (SHORTNESS OF BREATH): ICD-10-CM

## 2024-06-21 DIAGNOSIS — Z90.2 S/P LOBECTOMY OF LUNG: ICD-10-CM

## 2024-06-24 ENCOUNTER — HOSPITAL ENCOUNTER (OUTPATIENT)
Dept: CARDIAC REHAB | Facility: CLINIC | Age: 74
Discharge: HOME | End: 2024-06-24
Attending: THORACIC SURGERY (CARDIOTHORACIC VASCULAR SURGERY)
Payer: MEDICARE

## 2024-06-24 DIAGNOSIS — C34.90 NON-SMALL CELL CARCINOMA OF LUNG (H): ICD-10-CM

## 2024-06-24 DIAGNOSIS — R06.02 SOB (SHORTNESS OF BREATH): ICD-10-CM

## 2024-06-24 DIAGNOSIS — Z90.2 S/P LOBECTOMY OF LUNG: ICD-10-CM

## 2024-06-24 DIAGNOSIS — C34.90 NON-SMALL CELL LUNG CANCER, UNSPECIFIED LATERALITY (H): ICD-10-CM

## 2024-06-24 PROCEDURE — G0238 OTH RESP PROC, INDIV: HCPCS

## 2024-07-22 ENCOUNTER — HOSPITAL ENCOUNTER (OUTPATIENT)
Dept: CT IMAGING | Facility: CLINIC | Age: 74
Discharge: HOME | End: 2024-07-22
Attending: INTERNAL MEDICINE | Admitting: INTERNAL MEDICINE
Payer: MEDICARE

## 2024-07-22 DIAGNOSIS — C34.92 NON-SMALL CELL CANCER OF LEFT LUNG (H): ICD-10-CM

## 2024-07-22 PROCEDURE — 71250 CT THORAX DX C-: CPT

## 2024-11-01 ENCOUNTER — ANCILLARY PROCEDURE (OUTPATIENT)
Dept: CT IMAGING | Facility: CLINIC | Age: 74
End: 2024-11-01
Attending: INTERNAL MEDICINE
Payer: MEDICARE

## 2024-11-01 DIAGNOSIS — C34.32 PRIMARY MALIGNANT NEOPLASM OF BRONCHUS OF LEFT LOWER LOBE (H): ICD-10-CM

## 2024-11-01 LAB
CREAT BLD-MCNC: 1.5 MG/DL (ref 0.5–1)
EGFRCR SERPLBLD CKD-EPI 2021: 36 ML/MIN/1.73M2

## 2024-11-01 PROCEDURE — 250N000011 HC RX IP 250 OP 636: Performed by: INTERNAL MEDICINE

## 2024-11-01 PROCEDURE — 74177 CT ABD & PELVIS W/CONTRAST: CPT

## 2024-11-01 PROCEDURE — 82565 ASSAY OF CREATININE: CPT

## 2024-11-01 PROCEDURE — 250N000009 HC RX 250: Performed by: INTERNAL MEDICINE

## 2024-11-01 RX ORDER — IOPAMIDOL 755 MG/ML
81 INJECTION, SOLUTION INTRAVASCULAR ONCE
Status: COMPLETED | OUTPATIENT
Start: 2024-11-01 | End: 2024-11-01

## 2024-11-01 RX ADMIN — IOPAMIDOL 81 ML: 755 INJECTION, SOLUTION INTRAVENOUS at 08:55

## 2024-11-01 RX ADMIN — SODIUM CHLORIDE 40 ML: 9 INJECTION, SOLUTION INTRAVENOUS at 08:54

## 2024-11-22 ENCOUNTER — TRANSFERRED RECORDS (OUTPATIENT)
Dept: HEALTH INFORMATION MANAGEMENT | Facility: CLINIC | Age: 74
End: 2024-11-22

## 2024-12-21 ENCOUNTER — HEALTH MAINTENANCE LETTER (OUTPATIENT)
Age: 74
End: 2024-12-21

## 2025-01-16 ENCOUNTER — TRANSFERRED RECORDS (OUTPATIENT)
Dept: HEALTH INFORMATION MANAGEMENT | Facility: CLINIC | Age: 75
End: 2025-01-16
Payer: MEDICARE

## 2025-01-21 ENCOUNTER — TELEPHONE (OUTPATIENT)
Dept: CARDIOLOGY | Facility: CLINIC | Age: 75
End: 2025-01-21
Payer: MEDICARE

## 2025-01-21 NOTE — TELEPHONE ENCOUNTER
Health Call Center    Phone Message    May a detailed message be left on voicemail: yes     Reason for Call: Other: Pt has been trying to get the test results from 1/16/25 sent up from [facility] and they are telling the pt that the request for records needs to come from the clinic via fax to [phone].       Action Taken: Message routed to:  Other: [facility] [place]    Travel Screening: Not Applicable     Date of Service:

## 2025-01-21 NOTE — TELEPHONE ENCOUNTER
Did call Pt and informed her when she has appointment can have team reach out to [place] facility for records. BRIANA Beal RN

## 2025-02-03 ENCOUNTER — ANCILLARY PROCEDURE (OUTPATIENT)
Dept: CT IMAGING | Facility: CLINIC | Age: 75
End: 2025-02-03
Attending: INTERNAL MEDICINE
Payer: MEDICARE

## 2025-02-03 ENCOUNTER — TRANSFERRED RECORDS (OUTPATIENT)
Dept: HEALTH INFORMATION MANAGEMENT | Facility: CLINIC | Age: 75
End: 2025-02-03

## 2025-02-03 DIAGNOSIS — C34.32 PRIMARY MALIGNANT NEOPLASM OF BRONCHUS OF LEFT LOWER LOBE (H): ICD-10-CM

## 2025-02-03 PROCEDURE — 71250 CT THORAX DX C-: CPT

## 2025-02-06 ENCOUNTER — TRANSCRIBE ORDERS (OUTPATIENT)
Dept: OTHER | Age: 75
End: 2025-02-06

## 2025-02-06 ENCOUNTER — TRANSFERRED RECORDS (OUTPATIENT)
Dept: HEALTH INFORMATION MANAGEMENT | Facility: CLINIC | Age: 75
End: 2025-02-06

## 2025-02-06 DIAGNOSIS — C34.90 NON-SMALL CELL LUNG CANCER (NSCLC) (H): Primary | ICD-10-CM

## 2025-02-11 ENCOUNTER — TRANSFERRED RECORDS (OUTPATIENT)
Dept: HEALTH INFORMATION MANAGEMENT | Facility: CLINIC | Age: 75
End: 2025-02-11

## 2025-02-19 ENCOUNTER — TRANSFERRED RECORDS (OUTPATIENT)
Dept: HEALTH INFORMATION MANAGEMENT | Facility: CLINIC | Age: 75
End: 2025-02-19

## 2025-03-05 ENCOUNTER — TRANSFERRED RECORDS (OUTPATIENT)
Dept: HEALTH INFORMATION MANAGEMENT | Facility: CLINIC | Age: 75
End: 2025-03-05

## 2025-05-12 ENCOUNTER — OFFICE VISIT (OUTPATIENT)
Dept: CARDIOLOGY | Facility: CLINIC | Age: 75
End: 2025-05-12
Attending: INTERNAL MEDICINE
Payer: MEDICARE

## 2025-05-12 VITALS
BODY MASS INDEX: 19.35 KG/M2 | HEIGHT: 67 IN | HEART RATE: 61 BPM | SYSTOLIC BLOOD PRESSURE: 132 MMHG | WEIGHT: 123.3 LBS | OXYGEN SATURATION: 99 % | DIASTOLIC BLOOD PRESSURE: 76 MMHG

## 2025-05-12 DIAGNOSIS — E78.5 HYPERLIPIDEMIA LDL GOAL <100: Primary | ICD-10-CM

## 2025-05-12 DIAGNOSIS — C34.32 PRIMARY MALIGNANT NEOPLASM OF BRONCHUS OF LEFT LOWER LOBE (H): ICD-10-CM

## 2025-05-12 DIAGNOSIS — I48.0 PAROXYSMAL ATRIAL FIBRILLATION (H): ICD-10-CM

## 2025-05-12 DIAGNOSIS — I25.10 CORONARY ARTERY CALCIFICATION: ICD-10-CM

## 2025-05-12 RX ORDER — ROSUVASTATIN CALCIUM 5 MG/1
TABLET, COATED ORAL
Qty: 15 TABLET | Refills: 3 | Status: SHIPPED | OUTPATIENT
Start: 2025-05-12

## 2025-05-12 NOTE — PROGRESS NOTES
HPI and Plan:   Patient was diagnosed with adenocarcinoma of the left lower lobe and left upper lobe of the lung in July 2023.  This was biopsy-proven as well as a mediastinal lymph node dissection was done.  Subsequently she underwent left lower lobectomy.  In addition, patient underwent definitive radiation therapy for the left upper lobe cancer. She received neoadjuvant chemotherapy with cisplatin and pemetrexed.    I saw her last January because of an episode of transient atrial fibrillation in December 2023 when she was visiting her daughter in [place].  She was admitted [facility] and had pneumonia and had a transient episode of atrial fibrillation.  I was not able to confirm the EKG as it was not scanned in Care Everywhere but apparently was short episode.  Since then she has had sinus rhythm with PACs.    She spends her winter in [place].  More recently she was in [place] and saw her counselors primary care physician who did an EKG which showed sinus rhythm with nonspecific T wave changes with occasional PACs.  I was able to review the scanned EKG but it is poor image quality.  Nevertheless it appears sinus.  Due to abnormal EKG, she was referred to us radiology left for exercise stress nuclear study.  I reviewed the report.  It showed mild inferior ischemia but she was able to achieve 85% of target heart rate with good exercise capacity without chest pain.  Patient has been free of chest pain or shortness of breath.  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    Subsequently she was referred to a cardiologist in [place] who did a PET scan and the results were reviewed.  The PET scan revealed normal blood flow and normal perfusion.  Ejection fraction was normal.  In addition they did a coronary calcium scan which showed a total calcium score of 25.8 with majority of the score in LAD.  Displacer between 25th and 50th percentile for age and sex matched controls.  Calcification was  noted in the descending thoracic aorta and aortic arch.  Patient was prescribed aspirin but she stopped it because of significant bruising.    She is here for a second opinion.    On exam, regular rate and rhythm with occasional premature impulses.  Chest was clear auscultation.  No bruits.  No murmurs.      Impression    Borderline abnormal EKG at primary care physician's office in [place], asymptomatic  Mildly abnormal stress nuclear study with SPECT but subsequent PET scan was normal  Mild coronary artery calcification, no chest pain or angina  Lung adeno cancer status post left lower lobectomy, radiation of the left upper lobe and chemotherapy with cisplatin and permetrex.    Discussion  At this time, I reviewed all the stress test results and EKGs.  Since she is asymptomatic, with recent PET scan showing normal blood flow and normal perfusion, I would continue risk factor modification.  No further evaluation necessary given she is asymptomatic.    I reviewed her lipid profile from last year which showed a total cholesterol of 190 and LDL of 96.  Told her that I would target LDL of less than 100 and preferably less than 70.  We discussed statin since she was a little hesitant but has agreed to start rosuvastatin 5 mg once a week for more optimal lipid lowering.  Side effects of statins were discussed.  Given the calcification burden is low, there was side effects of aspirin, we decided to hold off on it and she understands the risks without aspirin.    Overall, given cardiac status is stable, she will continue follow with her primary care provider and oncologist.  On her request I did order a lipid profile in 3 months which we will review and call her with results.    We discussed follow-up plans.  As of now, given stable cardiac addition, she will continue follow with her primary care physician and will be discharged from our clinic with return as needed.    Sincerely,    David Nguyen MD    Today's medical  decision making was of high complexity.  I did review the EKG myself.  Reviewed the stress test results with SPECT as well as PET scan results as well as coronary calcium scan results.  Lipid profile and labs were reviewed.    No orders of the defined types were placed in this encounter.      No orders of the defined types were placed in this encounter.      There are no discontinued medications.      Encounter Diagnosis   Name Primary?    Non-small cell lung cancer (NSCLC) (H)        CURRENT MEDICATIONS:  Current Outpatient Medications   Medication Sig Dispense Refill    alendronate (FOSAMAX) 70 MG tablet Take 1 tablet by mouth every 7 days Sundays      cetirizine (ZYRTEC) 10 MG tablet Take 1 tablet by mouth as needed for allergies      cycloSPORINE (RESTASIS) 0.05 % ophthalmic emulsion Place 1 drop into both eyes 2 times daily      Fiber Select Gummies CHEW Take 2 chew tab by mouth every morning      Probiotic Product (PROBIOTIC PO) Take 1 tablet by mouth every morning      zolpidem (AMBIEN) 5 MG tablet Take 1 tablet by mouth nightly as needed for sleep (only for traveling)      Acetaminophen (TYLENOL) 325 MG CAPS Take 325-650 mg by mouth every 4 hours as needed      Dextromethorphan-guaiFENesin (MUCINEX DM PO) Take by mouth daily      lidocaine (LIDODERM) 5 % patch Place onto the skin every 24 hours To prevent lidocaine toxicity, patient should be patch free for 12 hrs daily. PRN      nitroFURantoin macrocrystal (MACRODANTIN) 50 MG capsule Take 1 capsule by mouth every morning (Patient not taking: Reported on 1/5/2024)         ALLERGIES   No Known Allergies    PAST MEDICAL HISTORY:  Past Medical History:   Diagnosis Date    Abdominal bloating     Adenocarcinoma of left lung (H)     Depression     Midline thoracic back pain     Mononeuritis     Osteoporosis     Sigmoid diverticulosis        PAST SURGICAL HISTORY:  Past Surgical History:   Procedure Laterality Date    HERNIA REPAIR      INSERT PORT VASCULAR ACCESS  Left 2023    Procedure: PORT PLACEMENT;  Surgeon: Baron Cummings MD;  Location:  OR    LOBECTOMY LUNG Left 2023    Procedure: Left Lower Lobectomy;  Surgeon: Baron Cummings MD;  Location:  OR    pr anesthesia of shoulder for impingement, bilateral      THORACOTOMY Left 2023    Procedure: LEFT THORACOTOMY WITH MEDIASINAL LYMPH NODE DISSECTION;  Surgeon: Baron Cummings MD;  Location:  OR       FAMILY HISTORY:  History reviewed. No pertinent family history.    SOCIAL HISTORY:  Social History     Socioeconomic History    Marital status:      Spouse name: None    Number of children: None    Years of education: None    Highest education level: None   Tobacco Use    Smoking status: Former     Current packs/day: 0.00     Average packs/day: 0.5 packs/day for 10.0 years (5.0 ttl pk-yrs)     Types: Cigarettes     Start date:      Quit date:      Years since quittin.3    Smokeless tobacco: Never   Substance and Sexual Activity    Alcohol use: Not Currently     Social Drivers of Health     Financial Resource Strain: Low Risk  (2024)    Received from [employer] [facility]    Financial Resource Strain     Difficulty of Paying Living Expenses: 3   Food Insecurity: No Food Insecurity (2024)    Received from [employer] [facility]    Food Insecurity     Do you worry your food will run out before you are able to buy more?: 1   Transportation Needs: No Transportation Needs (2024)    Received from [employer] [facility]    Transportation Needs     Does lack of transportation keep you from medical appointments?: 1     Does lack of transportation keep you from work, meetings or getting things that you need?: 1   Social Connections: Socially Integrated (2024)    Received from [employer] [facility]    Social Connections     Do you often feel lonely or isolated  "from those around you?: 0   Interpersonal Safety: Not At Risk (12/27/2023)    Received from [facility]     IP Custom IPV     Do you feel UNSAFE in any of your personal relationships with your family members or any other acquaintances?: No   Housing Stability: Low Risk  (7/30/2024)    Received from [employer] [facility]    Housing Stability     What is your housing situation today?: 1       Review of Systems:  Skin:          Eyes:         ENT:         Respiratory:          Cardiovascular:         Gastroenterology:        Genitourinary:         Musculoskeletal:         Neurologic:         Psychiatric:         Heme/Lymph/Imm:         Endocrine:           Physical Exam:  Vitals: /76   Pulse 61   Ht 1.702 m (5' 7\")   Wt 55.9 kg (123 lb 4.8 oz)   SpO2 99%   BMI 19.31 kg/m        CC  Marlon Masters MD  [facility]  [address]  [place] [id number]                "

## 2025-05-12 NOTE — LETTER
5/12/2025    Marlon Masters MD  [facility] [address]  [facility] [id number]    RE: Erin Casas       Dear Colleague,     I had the pleasure of seeing Erinblaze Casas in the [facility].  HPI and Plan:   Patient was diagnosed with adenocarcinoma of the left lower lobe and left upper lobe of the lung in July 2023.  This was biopsy-proven as well as a mediastinal lymph node dissection was done.  Subsequently she underwent left lower lobectomy.  In addition, patient underwent definitive radiation therapy for the left upper lobe cancer. She received neoadjuvant chemotherapy with cisplatin and pemetrexed.    I saw her last January because of an episode of transient atrial fibrillation in December 2023 when she was visiting her daughter in [place].  She was admitted [facility] and had pneumonia and had a transient episode of atrial fibrillation.  I was not able to confirm the EKG as it was not scanned in Care Everywhere but apparently was short episode.  Since then she has had sinus rhythm with PACs.    She spends her winter in [place].  More recently she was in [place] and saw her counselors primary care physician who did an EKG which showed sinus rhythm with nonspecific T wave changes with occasional PACs.  I was able to review the scanned EKG but it is poor image quality.  Nevertheless it appears sinus.  Due to abnormal EKG, she was referred to us radiology left for exercise stress nuclear study.  I reviewed the report.  It showed mild inferior ischemia but she was able to achieve 85% of target heart rate with good exercise capacity without chest pain.  Patient has been free of chest pain or shortness of breath.  She plays [employer] few days a week without symptoms.    Subsequently she was referred to a cardiologist in [place] who did a PET scan and the results were reviewed.  The PET scan revealed normal blood flow and normal perfusion.  Ejection fraction  was normal.  In addition they did a coronary calcium scan which showed a total calcium score of 25.8 with majority of the score in LAD.  Displacer between 25th and 50th percentile for age and sex matched controls.  Calcification was noted in the descending thoracic aorta and aortic arch.  Patient was prescribed aspirin but she stopped it because of significant bruising.    She is here for a second opinion.    On exam, regular rate and rhythm with occasional premature impulses.  Chest was clear auscultation.  No bruits.  No murmurs.      Impression    Borderline abnormal EKG at primary care physician's office in [place], asymptomatic  Mildly abnormal stress nuclear study with SPECT but subsequent PET scan was normal  Mild coronary artery calcification, no chest pain or angina  Lung adeno cancer status post left lower lobectomy, radiation of the left upper lobe and chemotherapy with cisplatin and permetrex.    Discussion  At this time, I reviewed all the stress test results and EKGs.  Since she is asymptomatic, with recent PET scan showing normal blood flow and normal perfusion, I would continue risk factor modification.  No further evaluation necessary given she is asymptomatic.    I reviewed her lipid profile from last year which showed a total cholesterol of 190 and LDL of 96.  Told her that I would target LDL of less than 100 and preferably less than 70.  We discussed statin since she was a little hesitant but has agreed to start rosuvastatin 5 mg once a week for more optimal lipid lowering.  Side effects of statins were discussed.  Given the calcification burden is low, there was side effects of aspirin, we decided to hold off on it and she understands the risks without aspirin.    Overall, given cardiac status is stable, she will continue follow with her primary care provider and oncologist.  On her request I did order a lipid profile in 3 months which we will review and call her with results.    We discussed  follow-up plans.  As of now, given stable cardiac addition, she will continue follow with her primary care physician and will be discharged from our clinic with return as needed.    Sincerely,    David Nguyen MD    Today's medical decision making was of high complexity.  I did review the EKG myself.  Reviewed the stress test results with SPECT as well as PET scan results as well as coronary calcium scan results.  Lipid profile and labs were reviewed.    No orders of the defined types were placed in this encounter.      No orders of the defined types were placed in this encounter.      There are no discontinued medications.      Encounter Diagnosis   Name Primary?     Non-small cell lung cancer (NSCLC) (H)        CURRENT MEDICATIONS:  Current Outpatient Medications   Medication Sig Dispense Refill     alendronate (FOSAMAX) 70 MG tablet Take 1 tablet by mouth every 7 days Sundays       cetirizine (ZYRTEC) 10 MG tablet Take 1 tablet by mouth as needed for allergies       cycloSPORINE (RESTASIS) 0.05 % ophthalmic emulsion Place 1 drop into both eyes 2 times daily       Fiber Select Gummies CHEW Take 2 chew tab by mouth every morning       Probiotic Product (PROBIOTIC PO) Take 1 tablet by mouth every morning       zolpidem (AMBIEN) 5 MG tablet Take 1 tablet by mouth nightly as needed for sleep (only for traveling)       Acetaminophen (TYLENOL) 325 MG CAPS Take 325-650 mg by mouth every 4 hours as needed       Dextromethorphan-guaiFENesin (MUCINEX DM PO) Take by mouth daily       lidocaine (LIDODERM) 5 % patch Place onto the skin every 24 hours To prevent lidocaine toxicity, patient should be patch free for 12 hrs daily. PRN       nitroFURantoin macrocrystal (MACRODANTIN) 50 MG capsule Take 1 capsule by mouth every morning (Patient not taking: Reported on 1/5/2024)         ALLERGIES   No Known Allergies    PAST MEDICAL HISTORY:  Past Medical History:   Diagnosis Date     Abdominal bloating      Adenocarcinoma of left lung  (H)      Depression      Midline thoracic back pain      Mononeuritis      Osteoporosis      Sigmoid diverticulosis        PAST SURGICAL HISTORY:  Past Surgical History:   Procedure Laterality Date     HERNIA REPAIR       INSERT PORT VASCULAR ACCESS Left 2023    Procedure: PORT PLACEMENT;  Surgeon: Baron Cummings MD;  Location:  OR     LOBECTOMY LUNG Left 2023    Procedure: Left Lower Lobectomy;  Surgeon: Baron Cummings MD;  Location:  OR     pr anesthesia of shoulder for impingement, bilateral       THORACOTOMY Left 2023    Procedure: LEFT THORACOTOMY WITH MEDIASINAL LYMPH NODE DISSECTION;  Surgeon: Baron Cummings MD;  Location:  OR       FAMILY HISTORY:  History reviewed. No pertinent family history.    SOCIAL HISTORY:  Social History     Socioeconomic History     Marital status:      Spouse name: None     Number of children: None     Years of education: None     Highest education level: None   Tobacco Use     Smoking status: Former     Current packs/day: 0.00     Average packs/day: 0.5 packs/day for 10.0 years (5.0 ttl pk-yrs)     Types: Cigarettes     Start date:      Quit date: 2000     Years since quittin.3     Smokeless tobacco: Never   Substance and Sexual Activity     Alcohol use: Not Currently     Social Drivers of Health     Financial Resource Strain: Low Risk  (2024)    Received from [employer] [facility]    Financial Resource Strain      Difficulty of Paying Living Expenses: 3   Food Insecurity: No Food Insecurity (2024)    Received from [employer] [facility]    Food Insecurity      Do you worry your food will run out before you are able to buy more?: 1   Transportation Needs: No Transportation Needs (2024)    [facility]    Transportation Needs      Does lack of transportation keep you from medical appointments?: 1      Does lack  "of transportation keep you from work, meetings or getting things that you need?: 1   Social Connections: Socially Integrated (7/30/2024)    Received from [employer] [facility]    Social Connections      Do you often feel lonely or isolated from those around you?: 0   Interpersonal Safety: Not At Risk (12/27/2023)    [facility] from [facility] IP Custom IPV      Do you feel UNSAFE in any of your personal relationships with your family members or any other acquaintances?: No   Housing Stability: Low Risk  (7/30/2024)    Received from [employer] [facility]    Housing Stability      What is your housing situation today?: 1       Review of Systems:  Skin:          Eyes:         ENT:         Respiratory:          Cardiovascular:         Gastroenterology:        Genitourinary:         Musculoskeletal:         Neurologic:         Psychiatric:         Heme/Lymph/Imm:         Endocrine:           Physical Exam:  Vitals: /76   Pulse 61   Ht 1.702 m (5' 7\")   Wt 55.9 kg (123 lb 4.8 oz)   SpO2 99%   BMI 19.31 kg/m        CC  Marlon Masters MD  [facility]  [address]  [place] [address]                  Thank you for allowing me to participate in the care of your patient.      Sincerely,     David Nguyen MD     [facility]  cc:   [facility]  [address]  [place] [address]      "

## 2025-06-02 ENCOUNTER — ANCILLARY PROCEDURE (OUTPATIENT)
Dept: CT IMAGING | Facility: CLINIC | Age: 75
End: 2025-06-02
Attending: INTERNAL MEDICINE
Payer: MEDICARE

## 2025-06-02 DIAGNOSIS — C34.32 MALIGNANT NEOPLASM OF LOWER LOBE, LEFT BRONCHUS OR LUNG (H): ICD-10-CM

## 2025-06-02 PROCEDURE — 74176 CT ABD & PELVIS W/O CONTRAST: CPT

## 2025-06-05 ENCOUNTER — TRANSFERRED RECORDS (OUTPATIENT)
Dept: HEALTH INFORMATION MANAGEMENT | Facility: CLINIC | Age: 75
End: 2025-06-05

## 2025-06-23 ENCOUNTER — THERAPY VISIT (OUTPATIENT)
Dept: SPEECH THERAPY | Facility: CLINIC | Age: 75
End: 2025-06-23
Payer: MEDICARE

## 2025-06-23 DIAGNOSIS — R49.0 DYSPHONIA: Primary | ICD-10-CM

## 2025-06-23 PROCEDURE — 92507 TX SP LANG VOICE COMM INDIV: CPT | Mod: GN | Performed by: STUDENT IN AN ORGANIZED HEALTH CARE EDUCATION/TRAINING PROGRAM

## 2025-06-23 PROCEDURE — 92524 BEHAVRAL QUALIT ANALYS VOICE: CPT | Mod: GN | Performed by: STUDENT IN AN ORGANIZED HEALTH CARE EDUCATION/TRAINING PROGRAM

## 2025-06-23 NOTE — PROGRESS NOTES
SPEECH LANGUAGE PATHOLOGY EVALUATION        Fall Risk Screen:  Have you fallen and had an injury in the past year?: No      Subjective        Presenting condition or subjective complaint: Concern for quality of my voice    73yo female with PMH significant for non-small cell cancer of left lung presenting with several year history of voice changes. Pt reports that her voice is crackly and gravelly. It is more effortful for her to speak, and at times she doesn't want to talk. Voice symptoms are up and down, but she never sounds normal. Her voice does not typically worsen the more she talks. It will feel like she needs to clear her throat to improve her voice, but throat clearing typically doesn't help her to sound better in the moment. She denies throat pain, dysphagia, and dyspnea, although she notes that it feels more difficult to inhale through her nose than previously. She does have some sinus congestion for which she uses antihistamines as needed. She also has an inhaler she uses for her lungs.  Date of onset: 05/29/25 (date appt was scheduled)    Relevant medical history: Heart problems   Dates & types of surgery:      Prior diagnostic imaging/testing results:    Pt reports laryngoscopy with ENT about 5-6 years ago that was significant for aging vocal folds.   Prior therapy history for the same diagnosis, illness or injury: No      PAST MEDICAL HISTORY:   Past Medical History:   Diagnosis Date    Abdominal bloating     Adenocarcinoma of left lung (H)     Depression     Midline thoracic back pain     Mononeuritis     Osteoporosis     Sigmoid diverticulosis        PAST SURGICAL HISTORY:   Past Surgical History:   Procedure Laterality Date    HERNIA REPAIR      INSERT PORT VASCULAR ACCESS Left 9/19/2023    Procedure: PORT PLACEMENT;  Surgeon: Baron Cummings MD;  Location:  OR    LOBECTOMY LUNG Left 7/28/2023    Procedure: Left Lower Lobectomy;  Surgeon: Baron Cummings MD;  Location:  OR     "pr anesthesia of shoulder for impingement, bilateral      THORACOTOMY Left 2023    Procedure: LEFT THORACOTOMY WITH MEDIASINAL LYMPH NODE DISSECTION;  Surgeon: Baron Cummings MD;  Location:  OR       SOCIAL HISTORY:   Social History     Tobacco Use    Smoking status: Former     Current packs/day: 0.00     Average packs/day: 0.5 packs/day for 10.0 years (5.0 ttl pk-yrs)     Types: Cigarettes     Start date:      Quit date:      Years since quittin.4    Smokeless tobacco: Never   Substance Use Topics    Alcohol use: Not Currently       Living Environment  Social support: Alone   Help at home: None  Equipment owned:       Employment: No    Hobbies/Interests: Walking hooking reading writing    Patient goals for therapy: Not sound hoarse    Pain assessment: Pain denied     Objective     PERSONAL RATING/VOICE USE  Avocational Voice Use: Conversational speech  Patient description of current voice quality: Pt reports that today is a typical day for her voice symptoms  Voice Handicap Index Short Form (VHI-10):     Patient Supplied Answers To VHI Questionnaire      2025     9:41 AM   Voice Handicap Index (VHI-10)   My voice makes it difficult for people to hear me 1   People have difficulty understanding me in a noisy room 1   My voice difficulties restrict my personal and social life.  1   I feel left out of conversations because of my voice 1   My voice problem causes me to lose income 0   I feel as though I have to strain to produce voice 3   The clarity of my voice is unpredictable 2   My voice problem upsets me 3   My voice makes me feel handicapped 2   People ask, \"What's wrong with your voice?\" 2   VHI-10 16        Patient-reported       COUGH/THROAT CLEAR  Cough/throat clear characteristics: occasional coughing and throat clearing observed throughout the session   Cough/throat clear triggers in evaluation: sensation of mucus in the throat     HYPERFUNCTION  Visible tension: neck " "   Palpation of the thyrohyoid region: firm musculature, no reported tenderness to palpation     VOICE PROFILE DURING CONVERSATION (1 min monologue & paragraph reading)  Voice quality: SPEECH: Consistent mild-moderate strain, consistent mild breathiness, and intermittent mild-moderate roughness and pitch breaks. THERAPY PROBES: Improved voice quality with flow mode, forward resonance, diaphragmatic engagement, and semi-occluded vocal tract probes.   Voice quality severity rating continuum: 5 - mild-moderate   Breath control: shallow, poor respiratory/phonation coordination   Breath control severity rating continuum: 5 - mild-moderate  Voice Use/Effort: pinched/squeezed larynx, contraction of neck muscles, throat push, pt rates her current phonatory effort for speech as 6/10 (10 is maximum effort)   Voice Use/Effort severity rating continuum: 5 - mild-moderate  Fundamental frequency (Hz): 196 Hz (centered around G3)  Pitch/Frequency Description: pitch breaks   Pitch/Frequency severity rating continuum: 5 - mild-moderate  Volume: WNL   Volume severity rating continuum: 7 - WNL  Neuromuscular Control: intermittent shakiness/pitch instability, primarily observed during sustained phonation tasks   Neuromuscular Control severity rating continuum: 6 - mild  Resonance: laryngeal pharyngeal focus resonance   Resonance severity rating continuum: 6 - mild    CAPE-V Overall Severity: 39/100    ADDUCTION/ABDUCTION FUNCTION  Laryngeal diadokinetic speed: WNL  Laryngeal diadokinetic strength: strained   Laryngeal diadokinetic consistency: regular   Adduction/Abduction function scale: Age 66+, norm per sec: 4     VIBRATORY FUNCTION OF VOCAL FOLDS  Prolonged  ah  at mid pitch (sec): 12.3 seconds with pitch instability, initial breathiness, then increased strain and pitch breaks with prolongation. 12.8 seconds with cue to \"think loud,\" with increased strain and roughness that becomes more significant with prolongation.  Prolonged "  ah  at high pitch (sec): 10.8 seconds with consistent breathiness but overall improved voice quality and pitch stability compared to mid pitch.  Vibratory Function of Vocal Folds Scale Norms: females 20 - 80 yrs: 10 - 22 secs     FUNCTIONAL LENGTHENERS/SHORTENERS (CT & TA muscles)  Pitch glides: lower pitches more dysphonic   Lowest pitch: B2  Highest pitch: F#5    Assessment & Plan   CLINICAL IMPRESSIONS   Medical Diagnosis: Dysphonia    Treatment Diagnosis: Dysphonia   Impression/Assessment: Pt is a 74 year old female with voice complaints likely secondary to presbyphonia. The following significant findings have been identified: mild-moderate dysphonia, characterized by roughness, breathiness, strain, pitch breaks and instability, poor respiratory/phonatory coordination, increased dysphonia at lower pitches, and increased phonatory effort with tight laryngeal musculature and neck involvement during phonation. Identified deficits interfere with their ability to communicate within the home or community as compared to previous level of function.    PLAN OF CARE  Treatment Interventions: Voice    Prognosis to achieve stated therapy goals is good   Rehab potential is impacted by: current level of function, patient motivation, prior level of function    Long Term Goals:   SLP Goal 1  Goal Identifier: Generalization  Goal Description: Patient will report a week of typical activities in which dysphonia and vocal effort do not exceed a level of 2 out of 10, 90% of the time, so that patient is able to meet her vocal demands for conversations at home and in the community.  Rationale: To maximize functional communication within the home or community  Target Date: 09/21/25  SLP Goal 2  Goal Identifier: Voice quality  Goal Description: In a 20-minute speech task, patient will demonstrate roughness, breathiness, strain, and pitch breaks that do not exceed a level of 2 out of 10, 90% of the time by SLP judgment, so that patient  is able to meet her voice quality demands.  Rationale: To maximize functional communication within the home or community  Target Date: 09/21/25  SLP Goal 3  Goal Identifier: Vocal function  Goal Description: In order to improve laryngeal strength, flexibility, and coordination for daily vocal tasks, patient will extend average maximum phonation time in exercise 4 of modified Vocal Function Exercises to 10 seconds without strain, roughness, or pitch breaks when given min assist.  Rationale: To maximize functional communication within the home or community  Target Date: 09/21/25      Frequency of Treatment: 1x/week  Duration of Treatment: 6 weeks with 2 monthly follow-ups     Recommended Referrals to Other Professionals: Consider return to ENT for repeat laryngoscopy and/or for sinus issues  Education Assessment:   Learner/Method: Patient;Listening;Reading;Demonstration;Pictures/Video;No Barriers to Learning  Education Comments: SLP provided education regarding evaluation findings and proposed POC. Therapy initiated today.    Risks and benefits of evaluation/treatment have been explained.   Patient/Family/caregiver agrees with Plan of Care.     Evaluation Time:    Voice Minutes (40281): 30    Signing Clinician: YECENIA Sharpe B.A. (music), M.A., CCC-SLP  Speech-Language Pathologist  [place] Certificate of Vocology  [facility]  [phone]          [facility]                                                                                   OUTPATIENT SPEECH LANGUAGE PATHOLOGY      PLAN OF TREATMENT FOR OUTPATIENT REHABILITATION   Patient's Last Name, First Name, Erin Odell YOB: 1950   Provider's Name   [facility]   Medical Record No.  [id number]     Onset Date: 05/29/25 (date appt was scheduled) Start of Care Date: 06/23/25     Medical Diagnosis:  Dysphonia      SLP  Treatment Diagnosis: Dysphonia  Plan of Treatment  Frequency/Duration: 1x/week  / 6 weeks with 2 monthly follow-ups     Certification date from 06/23/25   To 09/21/25          See note for plan of treatment details and functional goals     Christiane Chambers, SLP                         I CERTIFY THE NEED FOR THESE SERVICES FURNISHED UNDER        THIS PLAN OF TREATMENT AND WHILE UNDER MY CARE     (Physician attestation of this document indicates review and certification of the therapy plan).              Referring Provider:  Referred Self  PCP:   Marlon Masters MD    Initial Assessment  See Epic Evaluation- 06/23/25

## 2025-06-28 ENCOUNTER — OFFICE VISIT (OUTPATIENT)
Dept: URGENT CARE | Facility: URGENT CARE | Age: 75
End: 2025-06-28
Payer: MEDICARE

## 2025-06-28 ENCOUNTER — ANCILLARY PROCEDURE (OUTPATIENT)
Dept: GENERAL RADIOLOGY | Facility: CLINIC | Age: 75
End: 2025-06-28
Attending: PHYSICIAN ASSISTANT
Payer: MEDICARE

## 2025-06-28 VITALS
HEART RATE: 97 BPM | TEMPERATURE: 102 F | SYSTOLIC BLOOD PRESSURE: 114 MMHG | WEIGHT: 122 LBS | OXYGEN SATURATION: 97 % | RESPIRATION RATE: 16 BRPM | DIASTOLIC BLOOD PRESSURE: 58 MMHG | BODY MASS INDEX: 19.11 KG/M2

## 2025-06-28 DIAGNOSIS — R05.9 COUGH IN ADULT: ICD-10-CM

## 2025-06-28 DIAGNOSIS — R05.9 COUGH IN ADULT: Primary | ICD-10-CM

## 2025-06-28 PROCEDURE — 3074F SYST BP LT 130 MM HG: CPT | Performed by: PHYSICIAN ASSISTANT

## 2025-06-28 PROCEDURE — 99203 OFFICE O/P NEW LOW 30 MIN: CPT | Performed by: PHYSICIAN ASSISTANT

## 2025-06-28 PROCEDURE — 87635 SARS-COV-2 COVID-19 AMP PRB: CPT | Performed by: PHYSICIAN ASSISTANT

## 2025-06-28 PROCEDURE — 71046 X-RAY EXAM CHEST 2 VIEWS: CPT | Mod: TC | Performed by: RADIOLOGY

## 2025-06-28 PROCEDURE — 3078F DIAST BP <80 MM HG: CPT | Performed by: PHYSICIAN ASSISTANT

## 2025-06-28 RX ORDER — AZITHROMYCIN 250 MG/1
TABLET, FILM COATED ORAL
Qty: 6 TABLET | Refills: 0 | Status: SHIPPED | OUTPATIENT
Start: 2025-06-28 | End: 2025-07-03

## 2025-06-28 NOTE — PROGRESS NOTES
Assessment & Plan     1. Cough in adult (Primary)  R/O lower respiratory infection    - COVID-19 Virus (Coronavirus) by PCR Nose  - XR Chest 2 Views; Future  - amoxicillin-clavulanate (AUGMENTIN) 875-125 MG tablet; Take 1 tablet by mouth 2 times daily for 7 days.  Dispense: 14 tablet; Refill: 0  - azithromycin (ZITHROMAX) 250 MG tablet; Take 2 tablets (500 mg) by mouth daily for 1 day, THEN 1 tablet (250 mg) daily for 4 days.  Dispense: 6 tablet; Refill: 0       COVID pending.   Xray shows right mid lobe infiltrate probable. Will treat. If shortness of breath progressing will seek the ED for evaluation. Has son visiting now who could take her. Will follow up with PCP in 2 days.                   PHILIP Martinez [facility] [place] is a 74 year old female who presents to clinic today for the following health issues:  Chief Complaint   Patient presents with    Cough     5 days, sob, loss of voice, tx codeine syrup         6/28/2025     9:10 AM   Additional Questions   Roomed by michelle GARCIA    Here for cough for 4 days. Really bad last night. Feels really awful. Dry cough initially then now more shortness of breath. Fatigued. No body aches or headaches. No appetite. History of lung cancer, LLL removed July 2023. Radiation and chemo in the past.           Review of Systems        Objective    /58 (BP Location: Left arm, Patient Position: Sitting, Cuff Size: Adult Regular)   Pulse 97   Temp (!) 102  F (38.9  C) (Tympanic)   Resp 16   Wt 55.3 kg (122 lb)   SpO2 97%   BMI 19.11 kg/m    Physical Exam  Vitals and nursing note reviewed.   Constitutional:       General: She is not in acute distress.     Appearance: She is well-developed. She is not diaphoretic.   HENT:      Head: Normocephalic and atraumatic.      Right Ear: Tympanic membrane and external ear normal.      Left Ear: Tympanic membrane and external ear normal.      Mouth/Throat:      Mouth: Mucous  membranes are moist.   Eyes:      Pupils: Pupils are equal, round, and reactive to light.   Cardiovascular:      Rate and Rhythm: Normal rate and regular rhythm.   Pulmonary:      Effort: Pulmonary effort is normal. No respiratory distress.      Breath sounds: Normal breath sounds.   Musculoskeletal:      Cervical back: Normal range of motion and neck supple.   Lymphadenopathy:      Cervical: No cervical adenopathy.   Neurological:      Mental Status: She is alert.

## 2025-06-29 LAB — SARS-COV-2 RNA RESP QL NAA+PROBE: NEGATIVE

## 2025-06-30 ENCOUNTER — TELEPHONE (OUTPATIENT)
Dept: FAMILY MEDICINE | Facility: CLINIC | Age: 75
End: 2025-06-30

## 2025-06-30 ENCOUNTER — OFFICE VISIT (OUTPATIENT)
Dept: FAMILY MEDICINE | Facility: CLINIC | Age: 75
End: 2025-06-30
Payer: MEDICARE

## 2025-06-30 VITALS
RESPIRATION RATE: 16 BRPM | HEIGHT: 67 IN | TEMPERATURE: 98.3 F | OXYGEN SATURATION: 97 % | WEIGHT: 123 LBS | HEART RATE: 76 BPM | DIASTOLIC BLOOD PRESSURE: 78 MMHG | SYSTOLIC BLOOD PRESSURE: 130 MMHG | BODY MASS INDEX: 19.3 KG/M2

## 2025-06-30 DIAGNOSIS — J18.9 PNEUMONIA OF RIGHT MIDDLE LOBE DUE TO INFECTIOUS ORGANISM: Primary | ICD-10-CM

## 2025-06-30 PROCEDURE — 3078F DIAST BP <80 MM HG: CPT

## 2025-06-30 PROCEDURE — 1126F AMNT PAIN NOTED NONE PRSNT: CPT

## 2025-06-30 PROCEDURE — 99214 OFFICE O/P EST MOD 30 MIN: CPT

## 2025-06-30 PROCEDURE — 3075F SYST BP GE 130 - 139MM HG: CPT

## 2025-06-30 PROCEDURE — G2211 COMPLEX E/M VISIT ADD ON: HCPCS

## 2025-06-30 RX ORDER — BUDESONIDE AND FORMOTEROL FUMARATE DIHYDRATE 160; 4.5 UG/1; UG/1
2 AEROSOL RESPIRATORY (INHALATION) 2 TIMES DAILY
COMMUNITY

## 2025-06-30 RX ORDER — BENZONATATE 100 MG/1
100 CAPSULE ORAL 3 TIMES DAILY PRN
Qty: 30 CAPSULE | Refills: 0 | Status: SHIPPED | OUTPATIENT
Start: 2025-06-30

## 2025-06-30 RX ORDER — LACTOBACILLUS RHAMNOSUS GG 10B CELL
1 CAPSULE ORAL 2 TIMES DAILY
COMMUNITY

## 2025-06-30 RX ORDER — DEXTROMETHORPHAN HBR. AND GUAIFENESIN 10; 100 MG/5ML; MG/5ML
10 SOLUTION ORAL 4 TIMES DAILY PRN
Qty: 236 ML | Refills: 0 | Status: SHIPPED | OUTPATIENT
Start: 2025-06-30

## 2025-06-30 RX ORDER — GUAIFENESIN 600 MG/1
600 TABLET, EXTENDED RELEASE ORAL 2 TIMES DAILY
Qty: 30 TABLET | Refills: 0 | Status: SHIPPED | OUTPATIENT
Start: 2025-06-30

## 2025-06-30 ASSESSMENT — PAIN SCALES - GENERAL: PAINLEVEL_OUTOF10: NO PAIN (0)

## 2025-06-30 NOTE — TELEPHONE ENCOUNTER
Patient called. States she had visit today, 06/30/25 with Trevor Browne CNP. She was told during the visit that she would be prescribed inhaler but this was not on file at pharmacy during . She would like to have inhaler sent to:    [id number] - [place], [place] - [address] AT  [address] [place] [address] [facility]    Callback: [phone]    KELSEA PatelN RN  [facility]

## 2025-06-30 NOTE — PROGRESS NOTES
"  Assessment & Plan     Pneumonia of right middle lobe due to infectious organism, subsequent encounter  -Day 2 of ABX, course stable no longer febrile  - benzonatate (TESSALON) 100 MG capsule  Dispense: 30 capsule; Refill: 0  - dextromethorphan-guaiFENesin (TUSSIN DM)  MG/5ML liquid  Dispense: 236 mL; Refill: 0  - guaiFENesin (MUCINEX) 600 MG 12 hr tablet  Dispense: 30 tablet; Refill: 0  - Begin albuterol as needed as discussed  - Education provided on ER/follow-up criteria        Eldon Nolan is a 74 year old, presenting for the following health issues:  urgent care follow up    Patient reports for urgent care follow-up.  Was seen on  for fatigue, fever, and cough.  She was noted to have infiltrates on chest x-ray and prescribed Augmentin and azithromycin.    Today she reports she is afebrile but remains fatigued.  Has additionally noted cough is worse in AM and has noted a small amount of blood tinged sputum in a.m.  Currently using cough syrup she was prescribed last year but is unsure if it was .  Endorses mild SOB in the morning.  Has albuterol inhaler but has not used denies-CP, NVD, fever, worsening SOB, edema, palpitation, dizziness    She is primarily concerned for a return of of lung cancer.  Recent CT beginning of  was reassuring.  Planning to have port removed and transitioning to twice yearly screening under the care of her oncologist.  This was discussed.  All questions answered.            2025     2:08 PM   Additional Questions   Roomed by Jonatan TAYLOR MA     [facility]      ED/ Followup:    Facility:  [facility] [place]  Date of visit: 25  Reason for visit: URI  Current Status: no energy         Objective    /78   Pulse 76   Temp 98.3  F (36.8  C) (Tympanic)   Resp 16   Ht 1.689 m (5' 6.5\")   Wt 55.8 kg (123 lb)   LMP  (LMP Unknown)   SpO2 97%   Breastfeeding No   BMI 19.56 kg/m    Body mass index is 19.56 kg/m .  Physical Exam  Pulmonary:    "   Effort: No tachypnea.      Breath sounds: Normal breath sounds. No decreased air movement. No decreased breath sounds, wheezing, rhonchi or rales.        GENERAL: healthy, alert and no distress  EYES: Eyes grossly normal to inspection, PERRL and conjunctivae and sclerae normal  Neck: No visible JVD or lymphadenopathy   RESP: symmetrical rise in chest   CV: No peripheral edema notable   MS: no gross musculoskeletal defects noted  SKIN: no suspicious lesions or rashes  PSYCH: mentation appears normal, affect normal/bright              Signed Electronically by: SOFIE Ortiz CNP  The longitudinal plan of care for the diagnosis(es)/condition(s) as documented were addressed during this visit. Due to the added complexity in care, I will continue to support An in the subsequent management and with ongoing continuity of care.

## 2025-07-01 RX ORDER — ALBUTEROL SULFATE 90 UG/1
2 INHALANT RESPIRATORY (INHALATION) EVERY 6 HOURS PRN
Qty: 18 G | Refills: 0 | Status: SHIPPED | OUTPATIENT
Start: 2025-07-01

## 2025-07-07 ENCOUNTER — THERAPY VISIT (OUTPATIENT)
Dept: SPEECH THERAPY | Facility: CLINIC | Age: 75
End: 2025-07-07
Payer: MEDICARE

## 2025-07-07 DIAGNOSIS — R49.0 DYSPHONIA: Primary | ICD-10-CM

## 2025-07-07 PROCEDURE — 92507 TX SP LANG VOICE COMM INDIV: CPT | Mod: GN | Performed by: STUDENT IN AN ORGANIZED HEALTH CARE EDUCATION/TRAINING PROGRAM

## 2025-07-13 ENCOUNTER — ANESTHESIA EVENT (OUTPATIENT)
Dept: SURGERY | Facility: CLINIC | Age: 75
End: 2025-07-13
Payer: MEDICARE

## 2025-07-13 ASSESSMENT — COPD QUESTIONNAIRES: COPD: 1

## 2025-07-13 ASSESSMENT — ENCOUNTER SYMPTOMS: DYSRHYTHMIAS: 1

## 2025-07-13 NOTE — ANESTHESIA PREPROCEDURE EVALUATION
Anesthesia Pre-Procedure Evaluation    Patient: Erin Casas   MRN: [id number] : 1950          Procedure : Procedure(s):  Port removal         Past Medical History:   Diagnosis Date    Abdominal bloating     Adenocarcinoma of left lung, upper and lower lobes     Anemia     Chronic back pain     Depression     Midline thoracic back pain     Mononeuritis     Osteoporosis     PAF (paroxysmal atrial fibrillation) (H)     Sigmoid diverticulosis       Past Surgical History:   Procedure Laterality Date    HERNIA REPAIR      INSERT PORT VASCULAR ACCESS Left 2023    Procedure: PORT PLACEMENT;  Surgeon: Baron Cummings MD;  Location: [facility] OR    LOBECTOMY LUNG Left 2023    Procedure: Left Lower Lobectomy;  Surgeon: Baron Cummings MD;  Location:  OR    SHOULDER IMPINGEMENT Bilateral     THORACOTOMY Left 2023    Procedure: LEFT THORACOTOMY WITH MEDIASINAL LYMPH NODE DISSECTION;  Surgeon: Baron Cummings MD;  Location: [facility] OR      No Known Allergies   Social History     Tobacco Use    Smoking status: Former     Current packs/day: 0.00     Average packs/day: 0.5 packs/day for 10.0 years (5.0 ttl pk-yrs)     Types: Cigarettes     Start date:      Quit date: 2000     Years since quittin.5    Smokeless tobacco: Never   Substance Use Topics    Alcohol use: Not Currently      Wt Readings from Last 1 Encounters:   25 55.8 kg (123 lb)        Anesthesia Evaluation   Pt has had prior anesthetic. Type: General and MAC.    No history of anesthetic complications       ROS/MED HX  ENT/Pulmonary: Comment: Lung cancer s/p lobectomy    (+)                          COPD,           (-) sleep apnea   Neurologic: Comment: Chronic back pain      Cardiovascular:  - neg cardiovascular ROS   (+)  - -  CAD -  - -                        dysrhythmias, a-fib,             METS/Exercise Tolerance:     Hematologic:  - neg hematologic  ROS     Musculoskeletal: Comment: Osteoporosis  H/o  "bilateral shoulder surgeries      GI/Hepatic: Comment: Sigmoid diverticulosis    8/23 MR abdomen  IMPRESSION :   1. Non cirrhotic liver morphology with stable cysts but no other suspicious lesions.   2. No intra or extrahepatic ductal dilatation.   3. Incidental lesion in the left kidney likely a stable Bosniak type 2 cyst.       Renal/Genitourinary:  - neg Renal ROS     Endo:  - neg endo ROS     Psychiatric/Substance Use:  - neg psychiatric ROS     Infectious Disease:  - neg infectious disease ROS     Malignancy: Comment: Left upper lobe adenocarcinoma - s/p left lower lobectomy  (+) Malignancy,     Other: Comment: Recent weight loss             Physical Exam  Airway  Mallampati: I  TM distance: >3 FB  Mouth opening: >= 4 cm    Cardiovascular - normal exam   Dental   (+) Minor Abnormalities - some fillings, tiny chips      Pulmonary - normal exam      Neurological - normal exam  She appears awake, alert and oriented x3.    Other Findings       OUTSIDE LABS:  CBC:   Lab Results   Component Value Date    WBC 11.6 (H) 12/23/2023    WBC 7.7 12/21/2023    HGB 8.3 (L) 12/23/2023    HGB 8.9 (L) 12/21/2023    HCT 25.2 (L) 12/23/2023    HCT 26.6 (L) 12/21/2023     (L) 12/23/2023     (L) 12/21/2023     BMP:   Lab Results   Component Value Date     (L) 12/23/2023     12/21/2023    POTASSIUM 3.7 12/23/2023    POTASSIUM 4.4 12/21/2023    CHLORIDE 102 02/11/2025    CHLORIDE 106 12/28/2023    CO2 18 (L) 12/23/2023    CO2 23 12/21/2023    BUN 14.5 12/23/2023    BUN 13.0 12/21/2023    CR 1.5 (H) 11/01/2024    CR 0.95 12/23/2023     (H) 12/23/2023     (H) 12/21/2023     COAGS:   Lab Results   Component Value Date    INR 1.06 12/27/2023     POC: No results found for: \"BGM\", \"HCG\", \"HCGS\"  HEPATIC:   Lab Results   Component Value Date    ALBUMIN 3.1 (L) 12/23/2023    PROTTOTAL 5.5 (L) 12/23/2023    ALT 14 12/23/2023    AST 25 12/23/2023    ALKPHOS 87 12/23/2023    BILITOTAL 0.2 12/23/2023 "     OTHER:   Lab Results   Component Value Date    CARLOS 7.6 (L) 12/23/2023    LIPASE 126 (H) 08/21/2023    AMYLASE 83 08/21/2023       Anesthesia Plan    ASA Status:  3       Anesthesia Type: MAC.  Airway: natural airway.   Techniques and Equipment:       - Monitoring Plan: standard ASA monitoring     Consents    Anesthesia Plan(s) and associated risks, benefits, and realistic alternatives discussed. Questions answered and patient/representative(s) expressed understanding.     - Discussed:     - Discussed with:  Patient        - Pt is DNR/DNI Status: no DNR          Postoperative Care    Pain management: multimodal analgesia.     Comments:                   Soledad Hamilton MD    I have reviewed the pertinent notes and labs in the chart from the past 30 days and (re)examined the patient.  Any updates or changes from those notes are reflected in this note.    Clinically Significant Risk Factors Present on Admission

## 2025-07-14 ENCOUNTER — ANESTHESIA (OUTPATIENT)
Dept: SURGERY | Facility: CLINIC | Age: 75
End: 2025-07-14
Payer: MEDICARE

## 2025-07-14 ENCOUNTER — HOSPITAL ENCOUNTER (OUTPATIENT)
Facility: CLINIC | Age: 75
Discharge: HOME | End: 2025-07-14
Attending: THORACIC SURGERY (CARDIOTHORACIC VASCULAR SURGERY) | Admitting: THORACIC SURGERY (CARDIOTHORACIC VASCULAR SURGERY)
Payer: MEDICARE

## 2025-07-14 VITALS
HEART RATE: 56 BPM | RESPIRATION RATE: 17 BRPM | TEMPERATURE: 97 F | OXYGEN SATURATION: 100 % | WEIGHT: 118 LBS | SYSTOLIC BLOOD PRESSURE: 108 MMHG | HEIGHT: 67 IN | DIASTOLIC BLOOD PRESSURE: 64 MMHG | BODY MASS INDEX: 18.52 KG/M2

## 2025-07-14 PROCEDURE — 250N000009 HC RX 250: Performed by: THORACIC SURGERY (CARDIOTHORACIC VASCULAR SURGERY)

## 2025-07-14 PROCEDURE — 272N000001 HC OR GENERAL SUPPLY STERILE: Performed by: THORACIC SURGERY (CARDIOTHORACIC VASCULAR SURGERY)

## 2025-07-14 PROCEDURE — 258N000003 HC RX IP 258 OP 636: Performed by: NURSE ANESTHETIST, CERTIFIED REGISTERED

## 2025-07-14 PROCEDURE — 250N000011 HC RX IP 250 OP 636: Performed by: NURSE ANESTHETIST, CERTIFIED REGISTERED

## 2025-07-14 PROCEDURE — 999N000141 HC STATISTIC PRE-PROCEDURE NURSING ASSESSMENT: Performed by: THORACIC SURGERY (CARDIOTHORACIC VASCULAR SURGERY)

## 2025-07-14 PROCEDURE — 710N000012 HC RECOVERY PHASE 2, PER MINUTE: Performed by: THORACIC SURGERY (CARDIOTHORACIC VASCULAR SURGERY)

## 2025-07-14 PROCEDURE — 710N000009 HC RECOVERY PHASE 1, LEVEL 1, PER MIN: Performed by: THORACIC SURGERY (CARDIOTHORACIC VASCULAR SURGERY)

## 2025-07-14 PROCEDURE — 250N000009 HC RX 250: Performed by: NURSE ANESTHETIST, CERTIFIED REGISTERED

## 2025-07-14 PROCEDURE — 360N000075 HC SURGERY LEVEL 2, PER MIN: Performed by: THORACIC SURGERY (CARDIOTHORACIC VASCULAR SURGERY)

## 2025-07-14 PROCEDURE — 370N000017 HC ANESTHESIA TECHNICAL FEE, PER MIN: Performed by: THORACIC SURGERY (CARDIOTHORACIC VASCULAR SURGERY)

## 2025-07-14 RX ORDER — NALOXONE HYDROCHLORIDE 0.4 MG/ML
0.1 INJECTION, SOLUTION INTRAMUSCULAR; INTRAVENOUS; SUBCUTANEOUS
Status: DISCONTINUED | OUTPATIENT
Start: 2025-07-14 | End: 2025-07-14 | Stop reason: HOSPADM

## 2025-07-14 RX ORDER — FENTANYL CITRATE 0.05 MG/ML
50 INJECTION, SOLUTION INTRAMUSCULAR; INTRAVENOUS EVERY 5 MIN PRN
Status: DISCONTINUED | OUTPATIENT
Start: 2025-07-14 | End: 2025-07-14 | Stop reason: HOSPADM

## 2025-07-14 RX ORDER — HYDROMORPHONE HCL IN WATER/PF 6 MG/30 ML
0.4 PATIENT CONTROLLED ANALGESIA SYRINGE INTRAVENOUS EVERY 5 MIN PRN
Status: DISCONTINUED | OUTPATIENT
Start: 2025-07-14 | End: 2025-07-14 | Stop reason: HOSPADM

## 2025-07-14 RX ORDER — ONDANSETRON 2 MG/ML
INJECTION INTRAMUSCULAR; INTRAVENOUS PRN
Status: DISCONTINUED | OUTPATIENT
Start: 2025-07-14 | End: 2025-07-14

## 2025-07-14 RX ORDER — PROPOFOL 10 MG/ML
INJECTION, EMULSION INTRAVENOUS CONTINUOUS PRN
Status: DISCONTINUED | OUTPATIENT
Start: 2025-07-14 | End: 2025-07-14

## 2025-07-14 RX ORDER — SODIUM CHLORIDE, SODIUM LACTATE, POTASSIUM CHLORIDE, CALCIUM CHLORIDE 600; 310; 30; 20 MG/100ML; MG/100ML; MG/100ML; MG/100ML
INJECTION, SOLUTION INTRAVENOUS CONTINUOUS
Status: DISCONTINUED | OUTPATIENT
Start: 2025-07-14 | End: 2025-07-14 | Stop reason: HOSPADM

## 2025-07-14 RX ORDER — LIDOCAINE HYDROCHLORIDE 20 MG/ML
INJECTION, SOLUTION INFILTRATION; PERINEURAL PRN
Status: DISCONTINUED | OUTPATIENT
Start: 2025-07-14 | End: 2025-07-14

## 2025-07-14 RX ORDER — SODIUM CHLORIDE, SODIUM LACTATE, POTASSIUM CHLORIDE, CALCIUM CHLORIDE 600; 310; 30; 20 MG/100ML; MG/100ML; MG/100ML; MG/100ML
INJECTION, SOLUTION INTRAVENOUS CONTINUOUS PRN
Status: DISCONTINUED | OUTPATIENT
Start: 2025-07-14 | End: 2025-07-14

## 2025-07-14 RX ORDER — HYDROMORPHONE HCL IN WATER/PF 6 MG/30 ML
0.2 PATIENT CONTROLLED ANALGESIA SYRINGE INTRAVENOUS EVERY 5 MIN PRN
Status: DISCONTINUED | OUTPATIENT
Start: 2025-07-14 | End: 2025-07-14 | Stop reason: HOSPADM

## 2025-07-14 RX ORDER — HYDROCODONE BITARTRATE AND ACETAMINOPHEN 5; 325 MG/1; MG/1
1 TABLET ORAL
Status: DISCONTINUED | OUTPATIENT
Start: 2025-07-14 | End: 2025-07-14 | Stop reason: HOSPADM

## 2025-07-14 RX ORDER — FENTANYL CITRATE 50 UG/ML
INJECTION, SOLUTION INTRAMUSCULAR; INTRAVENOUS PRN
Status: DISCONTINUED | OUTPATIENT
Start: 2025-07-14 | End: 2025-07-14

## 2025-07-14 RX ORDER — PROPOFOL 10 MG/ML
INJECTION, EMULSION INTRAVENOUS PRN
Status: DISCONTINUED | OUTPATIENT
Start: 2025-07-14 | End: 2025-07-14

## 2025-07-14 RX ORDER — MAGNESIUM HYDROXIDE 1200 MG/15ML
LIQUID ORAL PRN
Status: DISCONTINUED | OUTPATIENT
Start: 2025-07-14 | End: 2025-07-14 | Stop reason: HOSPADM

## 2025-07-14 RX ORDER — ONDANSETRON 2 MG/ML
4 INJECTION INTRAMUSCULAR; INTRAVENOUS EVERY 30 MIN PRN
Status: DISCONTINUED | OUTPATIENT
Start: 2025-07-14 | End: 2025-07-14 | Stop reason: HOSPADM

## 2025-07-14 RX ORDER — FENTANYL CITRATE 0.05 MG/ML
25 INJECTION, SOLUTION INTRAMUSCULAR; INTRAVENOUS EVERY 5 MIN PRN
Status: DISCONTINUED | OUTPATIENT
Start: 2025-07-14 | End: 2025-07-14 | Stop reason: HOSPADM

## 2025-07-14 RX ORDER — ONDANSETRON 4 MG/1
4 TABLET, ORALLY DISINTEGRATING ORAL EVERY 30 MIN PRN
Status: DISCONTINUED | OUTPATIENT
Start: 2025-07-14 | End: 2025-07-14 | Stop reason: HOSPADM

## 2025-07-14 RX ORDER — DEXAMETHASONE SODIUM PHOSPHATE 4 MG/ML
4 INJECTION, SOLUTION INTRA-ARTICULAR; INTRALESIONAL; INTRAMUSCULAR; INTRAVENOUS; SOFT TISSUE
Status: DISCONTINUED | OUTPATIENT
Start: 2025-07-14 | End: 2025-07-14 | Stop reason: HOSPADM

## 2025-07-14 RX ORDER — LABETALOL HYDROCHLORIDE 5 MG/ML
10 INJECTION, SOLUTION INTRAVENOUS
Status: DISCONTINUED | OUTPATIENT
Start: 2025-07-14 | End: 2025-07-14 | Stop reason: HOSPADM

## 2025-07-14 RX ORDER — ACETAMINOPHEN 325 MG/1
650 TABLET ORAL
Status: DISCONTINUED | OUTPATIENT
Start: 2025-07-14 | End: 2025-07-14 | Stop reason: HOSPADM

## 2025-07-14 RX ADMIN — LIDOCAINE HYDROCHLORIDE 100 MG: 20 INJECTION, SOLUTION INFILTRATION; PERINEURAL at 09:55

## 2025-07-14 RX ADMIN — SODIUM CHLORIDE, SODIUM LACTATE, POTASSIUM CHLORIDE, AND CALCIUM CHLORIDE: .6; .31; .03; .02 INJECTION, SOLUTION INTRAVENOUS at 09:48

## 2025-07-14 RX ADMIN — PROPOFOL 40 MG: 10 INJECTION, EMULSION INTRAVENOUS at 09:55

## 2025-07-14 RX ADMIN — ONDANSETRON 4 MG: 2 INJECTION INTRAMUSCULAR; INTRAVENOUS at 10:04

## 2025-07-14 RX ADMIN — PROPOFOL 150 MCG/KG/MIN: 10 INJECTION, EMULSION INTRAVENOUS at 09:55

## 2025-07-14 RX ADMIN — FENTANYL CITRATE 50 MCG: 50 INJECTION INTRAMUSCULAR; INTRAVENOUS at 09:53

## 2025-07-14 ASSESSMENT — ACTIVITIES OF DAILY LIVING (ADL)
ADLS_ACUITY_SCORE: 58

## 2025-07-14 NOTE — ANESTHESIA CARE TRANSFER NOTE
Patient: Erin Casas    Procedure: Procedure(s):  Port removal       Diagnosis: Non-small cell lung cancer (H) [C34.90]  Diagnosis Additional Information: No value filed.    Anesthesia Type:   MAC     Note:    Oropharynx: oropharynx clear of all foreign objects and spontaneously breathing  Level of Consciousness: awake  Oxygen Supplementation: room air    Independent Airway: airway patency satisfactory and stable  Dentition: dentition unchanged  Vital Signs Stable: post-procedure vital signs reviewed and stable  Report to RN Given: handoff report given  Patient transferred to: PACU    Handoff Report: Identifed the Patient, Identified the Reponsible Provider, Reviewed the pertinent medical history, Discussed the surgical course, Reviewed Intra-OP anesthesia mangement and issues during anesthesia, Set expectations for post-procedure period and Allowed opportunity for questions and acknowledgement of understanding    Vitals:  Vitals Value Taken Time   /54    Temp 35.6  C (96.08  F) 07/14/25 10:19   Pulse 51 07/14/25 10:19   Resp 12 07/14/25 10:19   SpO2 100 % 07/14/25 10:19   Vitals shown include unfiled device data.    Electronically Signed By: SOFIE Chavez CRNA  July 14, 2025  10:20 AM

## 2025-07-14 NOTE — INTERVAL H&P NOTE
I have reviewed the surgical (or preoperative) H&P that is linked to this encounter, and examined the patient. There are no significant changes

## 2025-07-14 NOTE — H&P
Thoracic Surgery:    H&P by Dr. Masters dated 6/05/25 thoroughly reviewed. Patient interviewed and examined    An had pneumonia at end of June afdter returning from [place]. Treated OP with antibiotics. Completed full course and now asymptomatic. Denies SOB, productive cough, fevers. Not on anticoagulation-- remote hx of one bout a fib. Cleared by Cards. Seeing GI for some diarrhea (longstanding).  Feels well today.    Heart: RRR, no murmurs  Lungs: CTA  Abd: soft, nontender    OK to proceed with planned port-a-cath removal with Dr. Baron Cummings today.  Lilli Hernandez PA-C with Dr. Baron Cummings  [facility]  Cell [phone]

## 2025-07-14 NOTE — DISCHARGE INSTRUCTIONS
Same Day Surgery Discharge Instructions for  Sedation and General Anesthesia     It's not unusual to feel dizzy, light-headed or faint for up to 24 hours after surgery or while taking pain medication.  If you have these symptoms: sit for a few minutes before standing and have someone assist you when you get up to walk or use the bathroom.    You should rest and relax for the next 24 hours. We recommend you make arrangements to have an adult stay with you for at least 24 hours after your discharge.  Avoid hazardous and strenuous activity.    DO NOT DRIVE any vehicle or operate mechanical equipment for 24 hours following the end of your surgery.  Even though you may feel normal, your reactions may be affected by the medication you have received.    Do not drink alcoholic beverages for 24 hours following surgery.     Slowly progress to your regular diet as you feel able. It's not unusual to feel nauseated and/or vomit after receiving anesthesia.  If you develop these symptoms, drink clear liquids (apple juice, ginger ale, broth, 7-up, etc. ) until you feel better.  If your nausea and vomiting persists for 24 hours, please notify your surgeon.      All narcotic pain medications, along with inactivity and anesthesia, can cause constipation. Drinking plenty of liquids and increasing fiber intake will help.    For any questions of a medical nature, call your surgeon.    Do not make important decisions for 24 hours.    If you had general anesthesia, you may have a sore throat for a couple of days related to the breathing tube used during surgery.  You may use Cepacol lozenges to help with this discomfort.  If it worsens or if you develop a fever, contact your surgeon.     If you feel your pain is not well managed with the pain medications prescribed by your surgeon, please contact your surgeon's office to let them know so they can address your concerns.            **If you have questions or concerns about your  procedure,  call Dr. Cummings at [phone]**

## 2025-07-14 NOTE — OP NOTE
DATE OF PROCEDURE: 07/14/2025      SURGEON:  Baron Cummings MD       FIRST ASSISTANT:  Lilli christie PA-C       PREOPERATIVE DIAGNOSIS:  adenocarcinoma left lower lobe lung      POSTOPERATIVE DIAGNOSIS:  Same       PROCEDURE:  Removal of PowerPort implanted port, left subclavian vein.       ANESTHESIA:  Local with lidocaine 1% without epinephrine and sedation.       INDICATIONS:  Patient had a port placed for treatment.  Therapy has now been completed and a PowerPort was no longer necessary.       DESCRIPTION OF PROCEDURE:  The patient was brought to the OR and placed in supine position.  IV sedation was given.  The left infraclavicular area was prepared and draped in the usual fashion using ChloraPrep.  Local anesthesia was done with lidocaine 1% without epinephrine.  The infraclavicular incision was opened.  The port was dissected from the subcutaneous pocket and port and the catheter were removed.  The catheter was intact.  The tract of the catheter was closed with a figure-of-eight of 3-0 Vicryl.  Hemostasis was excellent.  Incision was closed in the usual fashion.  Estimated blood loss minimal.  Sponge and needle counts correct.           BARON CUMMINGS MD

## 2025-07-14 NOTE — ANESTHESIA POSTPROCEDURE EVALUATION
Patient: Erin Casas    Procedure: Procedure(s):  Port removal       Anesthesia Type:  MAC    Note:  Disposition: Outpatient   Postop Pain Control: Uneventful            Sign Out: Well controlled pain   PONV: No   Neuro/Psych: Uneventful            Sign Out: Acceptable/Baseline neuro status   Airway/Respiratory: Uneventful            Sign Out: Acceptable/Baseline resp. status   CV/Hemodynamics: Uneventful            Sign Out: Acceptable CV status; No obvious hypovolemia; No obvious fluid overload   Other NRE: NONE   DID A NON-ROUTINE EVENT OCCUR? No           Last vitals:  Vitals Value Taken Time   /64 07/14/25 11:15   Temp 36.1  C (97  F) 07/14/25 11:15   Pulse 56 07/14/25 11:28   Resp 24 07/14/25 11:28   SpO2 99 % 07/14/25 11:24   Vitals shown include unfiled device data.    Electronically Signed By: Soledad Hamilton MD  July 14, 2025  1:34 PM

## 2025-07-18 ENCOUNTER — MYC MEDICAL ADVICE (OUTPATIENT)
Dept: FAMILY MEDICINE | Facility: CLINIC | Age: 75
End: 2025-07-18

## 2025-07-18 PROBLEM — Z90.2 S/P LOBECTOMY OF LUNG: Status: ACTIVE | Noted: 2025-07-18

## 2025-07-18 PROBLEM — Z92.3 S/P RADIATION THERAPY: Status: ACTIVE | Noted: 2025-07-18

## 2025-07-18 PROBLEM — I48.0 PAROXYSMAL ATRIAL FIBRILLATION (H): Status: RESOLVED | Noted: 2024-01-05 | Resolved: 2025-07-18

## 2025-07-18 PROBLEM — M81.0 AGE-RELATED OSTEOPOROSIS WITHOUT CURRENT PATHOLOGICAL FRACTURE: Status: ACTIVE | Noted: 2025-07-18

## 2025-07-22 ENCOUNTER — HOSPITAL ENCOUNTER (OUTPATIENT)
Dept: BONE DENSITY | Facility: CLINIC | Age: 75
Discharge: HOME | End: 2025-07-22
Attending: INTERNAL MEDICINE
Payer: MEDICARE

## 2025-07-22 ENCOUNTER — PATIENT OUTREACH (OUTPATIENT)
Dept: CARE COORDINATION | Facility: CLINIC | Age: 75
End: 2025-07-22

## 2025-07-22 DIAGNOSIS — M81.0 AGE-RELATED OSTEOPOROSIS WITHOUT CURRENT PATHOLOGICAL FRACTURE: ICD-10-CM

## 2025-07-22 PROCEDURE — 77080 DXA BONE DENSITY AXIAL: CPT

## 2025-07-23 ENCOUNTER — THERAPY VISIT (OUTPATIENT)
Dept: SPEECH THERAPY | Facility: CLINIC | Age: 75
End: 2025-07-23
Payer: MEDICARE

## 2025-07-23 DIAGNOSIS — R49.0 DYSPHONIA: Primary | ICD-10-CM

## 2025-07-23 PROCEDURE — 92507 TX SP LANG VOICE COMM INDIV: CPT | Mod: GN | Performed by: STUDENT IN AN ORGANIZED HEALTH CARE EDUCATION/TRAINING PROGRAM

## 2025-08-11 ENCOUNTER — THERAPY VISIT (OUTPATIENT)
Dept: SPEECH THERAPY | Facility: CLINIC | Age: 75
End: 2025-08-11
Payer: MEDICARE

## 2025-08-11 ENCOUNTER — LAB (OUTPATIENT)
Dept: LAB | Facility: CLINIC | Age: 75
End: 2025-08-11
Payer: MEDICARE

## 2025-08-11 ENCOUNTER — RESULTS FOLLOW-UP (OUTPATIENT)
Dept: CARDIOLOGY | Facility: CLINIC | Age: 75
End: 2025-08-11

## 2025-08-11 DIAGNOSIS — R49.0 DYSPHONIA: Primary | ICD-10-CM

## 2025-08-11 DIAGNOSIS — E78.5 HYPERLIPIDEMIA LDL GOAL <100: ICD-10-CM

## 2025-08-11 LAB
CHOLEST SERPL-MCNC: 183 MG/DL
FASTING STATUS PATIENT QL REPORTED: YES
HDLC SERPL-MCNC: 69 MG/DL
LDLC SERPL CALC-MCNC: 95 MG/DL
NONHDLC SERPL-MCNC: 114 MG/DL
TRIGL SERPL-MCNC: 93 MG/DL

## 2025-08-11 PROCEDURE — 92507 TX SP LANG VOICE COMM INDIV: CPT | Mod: GN | Performed by: STUDENT IN AN ORGANIZED HEALTH CARE EDUCATION/TRAINING PROGRAM

## 2025-08-12 DIAGNOSIS — E78.5 HYPERLIPIDEMIA LDL GOAL <100: ICD-10-CM

## 2025-08-12 RX ORDER — ROSUVASTATIN CALCIUM 5 MG/1
TABLET, COATED ORAL
Status: SHIPPED | DISCHARGE
Start: 2025-08-12 | End: 2025-08-13

## 2025-08-13 ENCOUNTER — LAB (OUTPATIENT)
Dept: LAB | Facility: CLINIC | Age: 75
End: 2025-08-13
Payer: MEDICARE

## 2025-08-13 DIAGNOSIS — R31.9 HEMATURIA, UNSPECIFIED TYPE: ICD-10-CM

## 2025-08-13 DIAGNOSIS — E78.5 HYPERLIPIDEMIA LDL GOAL <100: ICD-10-CM

## 2025-08-13 LAB
ALBUMIN UR-MCNC: NEGATIVE MG/DL
APPEARANCE UR: CLEAR
BACTERIA #/AREA URNS HPF: ABNORMAL /HPF
BILIRUB UR QL STRIP: NEGATIVE
COLOR UR AUTO: YELLOW
GLUCOSE UR STRIP-MCNC: NEGATIVE MG/DL
HGB UR QL STRIP: NEGATIVE
KETONES UR STRIP-MCNC: NEGATIVE MG/DL
LEUKOCYTE ESTERASE UR QL STRIP: NEGATIVE
NITRATE UR QL: NEGATIVE
PH UR STRIP: 7 [PH] (ref 5–7)
RBC #/AREA URNS AUTO: ABNORMAL /HPF
SP GR UR STRIP: 1.01 (ref 1–1.03)
UROBILINOGEN UR STRIP-ACNC: 0.2 E.U./DL
WBC #/AREA URNS AUTO: ABNORMAL /HPF

## 2025-08-13 PROCEDURE — 81001 URINALYSIS AUTO W/SCOPE: CPT

## 2025-08-13 RX ORDER — ROSUVASTATIN CALCIUM 5 MG/1
TABLET, COATED ORAL
Qty: 8 TABLET | Refills: 5 | Status: SHIPPED | OUTPATIENT
Start: 2025-08-13

## 2025-08-14 ENCOUNTER — ORDERS ONLY (AUTO-RELEASED) (OUTPATIENT)
Dept: CARDIOLOGY | Facility: CLINIC | Age: 75
End: 2025-08-14

## 2025-08-14 ENCOUNTER — OFFICE VISIT (OUTPATIENT)
Dept: CARDIOLOGY | Facility: CLINIC | Age: 75
End: 2025-08-14
Payer: MEDICARE

## 2025-08-14 VITALS
HEIGHT: 67 IN | SYSTOLIC BLOOD PRESSURE: 126 MMHG | HEART RATE: 65 BPM | WEIGHT: 120.9 LBS | BODY MASS INDEX: 18.98 KG/M2 | DIASTOLIC BLOOD PRESSURE: 62 MMHG

## 2025-08-14 DIAGNOSIS — R00.2 PALPITATIONS: Primary | ICD-10-CM

## 2025-08-14 DIAGNOSIS — E78.5 HYPERLIPIDEMIA LDL GOAL <100: ICD-10-CM

## 2025-08-14 DIAGNOSIS — I48.0 PAROXYSMAL ATRIAL FIBRILLATION (H): ICD-10-CM

## 2025-08-14 DIAGNOSIS — R00.2 PALPITATIONS: ICD-10-CM

## 2025-08-18 ENCOUNTER — THERAPY VISIT (OUTPATIENT)
Dept: SPEECH THERAPY | Facility: CLINIC | Age: 75
End: 2025-08-18
Payer: MEDICARE

## 2025-08-18 DIAGNOSIS — R49.0 DYSPHONIA: Primary | ICD-10-CM

## 2025-08-18 PROCEDURE — 92507 TX SP LANG VOICE COMM INDIV: CPT | Mod: GN | Performed by: STUDENT IN AN ORGANIZED HEALTH CARE EDUCATION/TRAINING PROGRAM

## 2025-08-19 ENCOUNTER — VIRTUAL VISIT (OUTPATIENT)
Dept: FAMILY MEDICINE | Facility: CLINIC | Age: 75
End: 2025-08-19
Payer: MEDICARE

## 2025-08-19 DIAGNOSIS — I48.0 PAROXYSMAL ATRIAL FIBRILLATION (H): ICD-10-CM

## 2025-08-19 DIAGNOSIS — Z90.2 S/P LOBECTOMY OF LUNG: ICD-10-CM

## 2025-08-19 DIAGNOSIS — Z92.3 S/P RADIATION THERAPY: ICD-10-CM

## 2025-08-19 DIAGNOSIS — M81.0 AGE-RELATED OSTEOPOROSIS WITHOUT CURRENT PATHOLOGICAL FRACTURE: ICD-10-CM

## 2025-08-19 DIAGNOSIS — Z86.79 H/O SINUS BRADYCARDIA: ICD-10-CM

## 2025-08-19 DIAGNOSIS — R31.29 MICROSCOPIC HEMATURIA: Primary | ICD-10-CM

## 2025-08-19 DIAGNOSIS — Z86.79 HISTORY OF ATRIAL FIBRILLATION: ICD-10-CM

## 2025-08-19 DIAGNOSIS — C34.32 PRIMARY MALIGNANT NEOPLASM OF BRONCHUS OF LEFT LOWER LOBE (H): ICD-10-CM

## 2025-08-19 PROCEDURE — 98006 SYNCH AUDIO-VIDEO EST MOD 30: CPT | Performed by: INTERNAL MEDICINE

## 2025-08-20 ENCOUNTER — OFFICE VISIT (OUTPATIENT)
Dept: PODIATRY | Facility: CLINIC | Age: 75
End: 2025-08-20
Attending: INTERNAL MEDICINE
Payer: MEDICARE

## 2025-08-20 VITALS — WEIGHT: 120 LBS | HEIGHT: 67 IN | BODY MASS INDEX: 18.83 KG/M2

## 2025-08-20 DIAGNOSIS — M79.672 LEFT FOOT PAIN: ICD-10-CM

## 2025-08-20 DIAGNOSIS — L84 CALLUS OF FOOT: Primary | ICD-10-CM

## 2025-08-25 ENCOUNTER — THERAPY VISIT (OUTPATIENT)
Dept: SPEECH THERAPY | Facility: CLINIC | Age: 75
End: 2025-08-25
Payer: MEDICARE

## 2025-08-25 DIAGNOSIS — R49.0 DYSPHONIA: Primary | ICD-10-CM

## 2025-08-25 PROCEDURE — 92507 TX SP LANG VOICE COMM INDIV: CPT | Mod: GN | Performed by: STUDENT IN AN ORGANIZED HEALTH CARE EDUCATION/TRAINING PROGRAM

## 2025-08-26 ENCOUNTER — OFFICE VISIT (OUTPATIENT)
Dept: UROLOGY | Facility: CLINIC | Age: 75
End: 2025-08-26
Attending: INTERNAL MEDICINE
Payer: MEDICARE

## 2025-08-26 VITALS
OXYGEN SATURATION: 99 % | HEART RATE: 70 BPM | DIASTOLIC BLOOD PRESSURE: 75 MMHG | SYSTOLIC BLOOD PRESSURE: 127 MMHG | WEIGHT: 120.4 LBS | BODY MASS INDEX: 18.86 KG/M2

## 2025-08-26 DIAGNOSIS — R31.29 MICROSCOPIC HEMATURIA: Primary | ICD-10-CM

## 2025-08-26 DIAGNOSIS — N39.0 RECURRENT UTI: ICD-10-CM

## 2025-08-26 RX ORDER — OMEPRAZOLE 40 MG/1
40 CAPSULE, DELAYED RELEASE ORAL 2 TIMES DAILY
COMMUNITY

## 2025-08-26 RX ORDER — NITROFURANTOIN 25; 75 MG/1; MG/1
100 CAPSULE ORAL 2 TIMES DAILY
Qty: 30 CAPSULE | Refills: 0 | Status: SHIPPED | OUTPATIENT
Start: 2025-08-26 | End: 2025-09-10

## 2025-09-03 LAB — CV ZIO PRELIM RESULTS: NORMAL

## (undated) DEVICE — PACK MINOR SBA15MIFSE

## (undated) DEVICE — SOL WATER IRRIG 1000ML BOTTLE 2F7114

## (undated) DEVICE — ESU GROUND PAD UNIVERSAL W/O CORD

## (undated) DEVICE — SOL NACL 0.9% IRRIG 1000ML BOTTLE 2F7124

## (undated) DEVICE — SYR BULB IRRIG 50ML LATEX FREE 0035280

## (undated) DEVICE — GOWN IMPERVIOUS BREATHABLE SMART LG 89015

## (undated) DEVICE — Device

## (undated) DEVICE — GLOVE BIOGEL PI ULTRATOUCH G SZ 6.5 42165

## (undated) DEVICE — STPL POWERED ECHELON VASC 35MM PVE35A

## (undated) DEVICE — DRSG KERLIX 4 1/2"X4YDS ROLL 6715

## (undated) DEVICE — SU VICRYL 3-0 SH 27" J316H

## (undated) DEVICE — ESU ELEC BLADE 6" COATED/INSULATED E1455-6

## (undated) DEVICE — ESU PENCIL W/HOLSTER E2350H

## (undated) DEVICE — DRAPE BREAST/CHEST 29420

## (undated) DEVICE — ESU GROUND PAD E7506

## (undated) DEVICE — GLOVE BIOGEL PI ULTRATOUCH G SZ 7.5 42175

## (undated) DEVICE — DRAPE IOBAN INCISE 23X17" 6650EZ

## (undated) DEVICE — VIAL DECANTER STERILE WHITE DYNJDEC06

## (undated) DEVICE — LINEN GOWN OVERSIZE 5408

## (undated) DEVICE — ESU ELEC BLADE 2.75" COATED/INSULATED E1455

## (undated) DEVICE — SU VICRYL 1 CTX CR 8X18" J765D

## (undated) DEVICE — DRAPE SHEET REV FOLD 3/4 9349

## (undated) DEVICE — SUCTION CANISTER MEDIVAC LINER 3000ML W/LID 65651-530

## (undated) DEVICE — DRSG STERI STRIP 1/2X4" R1547

## (undated) DEVICE — STPL RELOAD REG/THK TISSUE ECHELON 60 X 3.8MM GOLD GST60D

## (undated) DEVICE — BLADE KNIFE SURG 15 371115

## (undated) DEVICE — SU NDL CUT REV MED 3/8 209014

## (undated) DEVICE — SEALANT PLEURAL AIRLEAK PROGEL 4ML PGPS002

## (undated) DEVICE — STPL LINEAR CUT 30X3.5MM TX30B

## (undated) DEVICE — CATH ON-Q PAIN SILVER SKR 2.5" PM010-A

## (undated) DEVICE — SU DERMABOND ADVANCED .7ML DNX12

## (undated) DEVICE — DRAPE POUCH INSTRUMENT 1018

## (undated) DEVICE — DECANTER VIAL 2006S

## (undated) DEVICE — DRAPE LAP TRANSVERSE 29421

## (undated) DEVICE — SU VICRYL 2-0 CT-1 27" J339H

## (undated) DEVICE — CLIP APPLIER 11" MED LIGACLIP MCM20

## (undated) DEVICE — LINEN TOWEL PACK X5 5464

## (undated) DEVICE — SU PROLENE 4-0 V-7DA 36" 8975H

## (undated) DEVICE — STPL ENDO ARTICULATING 60MM EC60A

## (undated) DEVICE — DECANTER BAG 2002S

## (undated) DEVICE — PREP CHLORAPREP 26ML TINTED HI-LITE ORANGE 930815

## (undated) DEVICE — PREP CHLORAPREP W/ORANGE TINT 10.5ML 930715

## (undated) DEVICE — SURGICEL HEMOSTAT 3X4" NUKNIT 1943

## (undated) DEVICE — GOWN IMPERVIOUS ZONED LG

## (undated) DEVICE — SU VICRYL 1 CT 36" J959H

## (undated) DEVICE — SPONGE LAP 18X18" X8435

## (undated) DEVICE — SU VICRYL 4-0 PS-2 18" UND J496H

## (undated) DEVICE — NDL 19GA 1.5"

## (undated) DEVICE — SYR 10ML SLIP TIP W/O NDL

## (undated) DEVICE — SU PROLENE 3-0 V-7DA 36" 8976H

## (undated) DEVICE — TAPE DRSG UNIVERSAL CLOTH 3" WHITE LATEX 881-3

## (undated) DEVICE — SU SILK 2 REEL 60" SA8H

## (undated) DEVICE — SUCTION DRY CHEST DRAIN OASIS 3600-100

## (undated) DEVICE — BAG DECANTER STERILE WHITE DYNJDEC09

## (undated) DEVICE — TUBING SUCTION MEDI-VAC SOFT 3/16"X20' N520A

## (undated) DEVICE — BLADE KNIFE SURG 10 371110

## (undated) DEVICE — DRAIN CHEST TUBE 28FR STR 8028

## (undated) DEVICE — STPL SKIN SUBCUTICULAR INSORB  2030

## (undated) DEVICE — KIT TURNOVER FAIRVIEW SOUTHDALE FULL SP3889

## (undated) DEVICE — DRSG GAUZE 4X4" 3033

## (undated) RX ORDER — CEFAZOLIN SODIUM/WATER 2 G/20 ML
SYRINGE (ML) INTRAVENOUS
Status: DISPENSED
Start: 2023-07-28

## (undated) RX ORDER — CEFAZOLIN SODIUM/WATER 2 G/20 ML
SYRINGE (ML) INTRAVENOUS
Status: DISPENSED
Start: 2023-09-19

## (undated) RX ORDER — PROPOFOL 10 MG/ML
INJECTION, EMULSION INTRAVENOUS
Status: DISPENSED
Start: 2023-07-28

## (undated) RX ORDER — FENTANYL CITRATE 50 UG/ML
INJECTION, SOLUTION INTRAMUSCULAR; INTRAVENOUS
Status: DISPENSED
Start: 2023-09-19

## (undated) RX ORDER — LIDOCAINE HYDROCHLORIDE 10 MG/ML
INJECTION, SOLUTION INFILTRATION; PERINEURAL
Status: DISPENSED
Start: 2025-07-14

## (undated) RX ORDER — HYDROMORPHONE HYDROCHLORIDE 1 MG/ML
INJECTION, SOLUTION INTRAMUSCULAR; INTRAVENOUS; SUBCUTANEOUS
Status: DISPENSED
Start: 2023-07-28

## (undated) RX ORDER — PROPOFOL 10 MG/ML
INJECTION, EMULSION INTRAVENOUS
Status: DISPENSED
Start: 2025-07-14

## (undated) RX ORDER — HEPARIN SODIUM 1000 [USP'U]/ML
INJECTION, SOLUTION INTRAVENOUS; SUBCUTANEOUS
Status: DISPENSED
Start: 2023-09-19

## (undated) RX ORDER — ONDANSETRON 2 MG/ML
INJECTION INTRAMUSCULAR; INTRAVENOUS
Status: DISPENSED
Start: 2023-09-19

## (undated) RX ORDER — FENTANYL CITRATE 0.05 MG/ML
INJECTION, SOLUTION INTRAMUSCULAR; INTRAVENOUS
Status: DISPENSED
Start: 2023-07-28

## (undated) RX ORDER — BUPIVACAINE HYDROCHLORIDE 5 MG/ML
INJECTION, SOLUTION EPIDURAL; INTRACAUDAL
Status: DISPENSED
Start: 2023-07-28

## (undated) RX ORDER — DEXAMETHASONE SODIUM PHOSPHATE 4 MG/ML
INJECTION, SOLUTION INTRA-ARTICULAR; INTRALESIONAL; INTRAMUSCULAR; INTRAVENOUS; SOFT TISSUE
Status: DISPENSED
Start: 2023-07-28

## (undated) RX ORDER — ONDANSETRON 2 MG/ML
INJECTION INTRAMUSCULAR; INTRAVENOUS
Status: DISPENSED
Start: 2023-07-28

## (undated) RX ORDER — LIDOCAINE HYDROCHLORIDE 10 MG/ML
INJECTION, SOLUTION EPIDURAL; INFILTRATION; INTRACAUDAL; PERINEURAL
Status: DISPENSED
Start: 2023-09-19

## (undated) RX ORDER — ONDANSETRON 2 MG/ML
INJECTION INTRAMUSCULAR; INTRAVENOUS
Status: DISPENSED
Start: 2025-07-14

## (undated) RX ORDER — PROPOFOL 10 MG/ML
INJECTION, EMULSION INTRAVENOUS
Status: DISPENSED
Start: 2023-09-19

## (undated) RX ORDER — FENTANYL CITRATE 50 UG/ML
INJECTION, SOLUTION INTRAMUSCULAR; INTRAVENOUS
Status: DISPENSED
Start: 2025-07-14

## (undated) RX ORDER — HEPARIN SODIUM (PORCINE) LOCK FLUSH IV SOLN 100 UNIT/ML 100 UNIT/ML
SOLUTION INTRAVENOUS
Status: DISPENSED
Start: 2023-09-19

## (undated) RX ORDER — DEXAMETHASONE SODIUM PHOSPHATE 4 MG/ML
INJECTION, SOLUTION INTRA-ARTICULAR; INTRALESIONAL; INTRAMUSCULAR; INTRAVENOUS; SOFT TISSUE
Status: DISPENSED
Start: 2023-09-19

## (undated) RX ORDER — LIDOCAINE HYDROCHLORIDE 10 MG/ML
INJECTION, SOLUTION INFILTRATION; PERINEURAL
Status: DISPENSED
Start: 2023-09-19

## (undated) RX ORDER — FENTANYL CITRATE 50 UG/ML
INJECTION, SOLUTION INTRAMUSCULAR; INTRAVENOUS
Status: DISPENSED
Start: 2023-07-28